# Patient Record
Sex: FEMALE | Race: WHITE | NOT HISPANIC OR LATINO | Employment: OTHER | URBAN - METROPOLITAN AREA
[De-identification: names, ages, dates, MRNs, and addresses within clinical notes are randomized per-mention and may not be internally consistent; named-entity substitution may affect disease eponyms.]

---

## 2017-08-09 ENCOUNTER — OFFICE VISIT (OUTPATIENT)
Dept: PEDIATRICS | Facility: CLINIC | Age: 47
End: 2017-08-09
Payer: COMMERCIAL

## 2017-08-09 ENCOUNTER — TELEPHONE (OUTPATIENT)
Dept: PEDIATRICS | Facility: CLINIC | Age: 47
End: 2017-08-09

## 2017-08-09 ENCOUNTER — RADIANT APPOINTMENT (OUTPATIENT)
Dept: GENERAL RADIOLOGY | Facility: CLINIC | Age: 47
End: 2017-08-09
Attending: INTERNAL MEDICINE
Payer: COMMERCIAL

## 2017-08-09 VITALS
HEART RATE: 78 BPM | HEIGHT: 69 IN | BODY MASS INDEX: 22.51 KG/M2 | SYSTOLIC BLOOD PRESSURE: 112 MMHG | TEMPERATURE: 96.7 F | OXYGEN SATURATION: 99 % | WEIGHT: 152 LBS | DIASTOLIC BLOOD PRESSURE: 70 MMHG

## 2017-08-09 DIAGNOSIS — S69.92XA FINGER INJURY, LEFT, INITIAL ENCOUNTER: Primary | ICD-10-CM

## 2017-08-09 DIAGNOSIS — M25.542 ARTHRALGIA OF LEFT HAND: ICD-10-CM

## 2017-08-09 DIAGNOSIS — S69.92XA FINGER INJURY, LEFT, INITIAL ENCOUNTER: ICD-10-CM

## 2017-08-09 LAB
BASOPHILS # BLD AUTO: 0 10E9/L (ref 0–0.2)
BASOPHILS NFR BLD AUTO: 0.6 %
CRP SERPL-MCNC: <2.9 MG/L (ref 0–8)
DIFFERENTIAL METHOD BLD: NORMAL
EOSINOPHIL # BLD AUTO: 0.2 10E9/L (ref 0–0.7)
EOSINOPHIL NFR BLD AUTO: 2.5 %
ERYTHROCYTE [DISTWIDTH] IN BLOOD BY AUTOMATED COUNT: 13.8 % (ref 10–15)
ERYTHROCYTE [SEDIMENTATION RATE] IN BLOOD BY WESTERGREN METHOD: 6 MM/H (ref 0–20)
HCT VFR BLD AUTO: 40.3 % (ref 35–47)
HGB BLD-MCNC: 13.8 G/DL (ref 11.7–15.7)
LYMPHOCYTES # BLD AUTO: 2.1 10E9/L (ref 0.8–5.3)
LYMPHOCYTES NFR BLD AUTO: 28.5 %
MCH RBC QN AUTO: 30 PG (ref 26.5–33)
MCHC RBC AUTO-ENTMCNC: 34.2 G/DL (ref 31.5–36.5)
MCV RBC AUTO: 88 FL (ref 78–100)
MONOCYTES # BLD AUTO: 0.6 10E9/L (ref 0–1.3)
MONOCYTES NFR BLD AUTO: 8.7 %
NEUTROPHILS # BLD AUTO: 4.3 10E9/L (ref 1.6–8.3)
NEUTROPHILS NFR BLD AUTO: 59.7 %
PLATELET # BLD AUTO: 234 10E9/L (ref 150–450)
RBC # BLD AUTO: 4.6 10E12/L (ref 3.8–5.2)
URATE SERPL-MCNC: 2.9 MG/DL (ref 2.6–6)
WBC # BLD AUTO: 7.2 10E9/L (ref 4–11)

## 2017-08-09 PROCEDURE — 73140 X-RAY EXAM OF FINGER(S): CPT | Mod: LT | Performed by: RADIOLOGY

## 2017-08-09 PROCEDURE — 84550 ASSAY OF BLOOD/URIC ACID: CPT | Performed by: INTERNAL MEDICINE

## 2017-08-09 PROCEDURE — 85025 COMPLETE CBC W/AUTO DIFF WBC: CPT | Performed by: INTERNAL MEDICINE

## 2017-08-09 PROCEDURE — 85652 RBC SED RATE AUTOMATED: CPT | Performed by: INTERNAL MEDICINE

## 2017-08-09 PROCEDURE — 99203 OFFICE O/P NEW LOW 30 MIN: CPT | Performed by: INTERNAL MEDICINE

## 2017-08-09 PROCEDURE — 36415 COLL VENOUS BLD VENIPUNCTURE: CPT | Performed by: INTERNAL MEDICINE

## 2017-08-09 PROCEDURE — 86140 C-REACTIVE PROTEIN: CPT | Performed by: INTERNAL MEDICINE

## 2017-08-09 NOTE — NURSING NOTE
"Chief Complaint   Patient presents with     Finger     Left index finger injury, Cut on knife 1 week ago, and now joint swelling       Initial /70 (Cuff Size: Adult Regular)  Pulse 78  Temp 96.7  F (35.9  C) (Temporal)  Ht 5' 9\" (1.753 m)  Wt 152 lb (68.9 kg)  SpO2 99%  BMI 22.45 kg/m2 Estimated body mass index is 22.45 kg/(m^2) as calculated from the following:    Height as of this encounter: 5' 9\" (1.753 m).    Weight as of this encounter: 152 lb (68.9 kg).  Medication Reconciliation: complete    "

## 2017-08-09 NOTE — TELEPHONE ENCOUNTER
[facility] Call Center    Phone Message    Name of Caller: Deirdre    Phone Number: Cell number on file:    Telephone Information:   Mobile [phone]       Best time to return call: any    May a detailed message be left on voicemail: yes    Relation to patient: Self    Reason for Call: Other: Patient is calling stating that she spoke with Raymundo Ortega who instructed the patient to start a chart and that Dr. Valencia clinic could work the patient in at 9:40 today for a New patient with Joint pain.  is unable to patient due to Dr. Fonseca having a patient scheduled at that time. Patient would like a call back once appointment is scheduled. Please advise,     Action Taken: Message routed to:  Primary Care p [phone]
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
## 2017-08-09 NOTE — MR AVS SNAPSHOT
After Visit Summary   2017    Deirdre Jesus    MRN: [id number]           Patient Information     Date Of Birth          1970        Visit Information        Provider Department      2017 10:00 AM Oneal Fonseca MD [facility]        Today's Diagnoses     Finger injury, left, initial encounter    -  1    Arthralgia of left hand          Care Instructions    Complete antibiotic.  Call if you blister development.                   Follow-ups after your visit        Who to contact     If you have questions or need follow up information about today's clinic visit or your schedule please contact [facility] directly at [phone].  Normal or non-critical lab and imaging results will be communicated to you by [employer], letter or phone within 4 business days after the clinic has received the results. If you do not hear from us within 7 days, please contact the clinic through [employer] or phone. If you have a critical or abnormal lab result, we will notify you by phone as soon as possible.  Submit refill requests through [employer] or call your pharmacy and they will forward the refill request to us. Please allow 3 business days for your refill to be completed.          Additional Information About Your Visit        MyChart Information     [employer] is an electronic gateway that provides easy, online access to your medical records. With [employer], you can request a clinic appointment, read your test results, renew a prescription or communicate with your care team.     To sign up for My [employer] visit the website at [email or web address]   You will be asked to enter the access code listed below, as well as some personal information. Please follow the directions to create your username and password.     Your access code is: [email or web address]  Expires: 2017 10:30 AM     Your access code will  in 90 days. If you need help or a new code, please contact your [facility]  [facility] [facility] or call [phone] for assistance.        Care EveryWhere ID     This is your Care EveryWhere ID. This could be used by other organizations to access your [facility] medical records  [id number]        Your Vitals Were     Pulse Temperature Height Pulse Oximetry BMI (Body Mass Index)       78 96.7  F (35.9  C) (Temporal) 5' 9\" (1.753 m) 99% 22.45 kg/m2        Blood Pressure from Last 3 Encounters:   08/09/17 112/70    Weight from Last 3 Encounters:   08/09/17 152 lb (68.9 kg)               Primary Care Provider    [facility]       No address on file        Equal Access to Services     FLEX QUIROS : Hadii carlin Huang, waaxda luqadaha, qaybta kaalmada adenorrisyada, alexis travis. So [facility] [phone].    ATENCIÓN: Si habla español, tiene a nathan disposición servicios gratuitos de asistencia lingüística. Llame al [phone].    We comply with applicable federal civil rights laws and [place] laws. We do not discriminate on the basis of race, color, national origin, age, disability sex, sexual orientation or gender identity.            Thank you!     Thank you for choosing [facility]  for your care. Our goal is always to provide you with excellent care. Hearing back from our patients is one way we can continue to improve our services. Please take a few minutes to complete the written survey that you may receive in the mail after your visit with us. Thank you!             Your Updated Medication List - Protect others around you: Learn how to safely use, store and throw away your medicines at www.disposemymeds.org.          This list is accurate as of: 8/9/17 10:30 AM.  Always use your most recent med list.                   Brand Name Dispense Instructions for use Diagnosis    amoxicillin-clavulanate 875-125 MG per tablet    AUGMENTIN     Take 1 tablet by mouth 2 times daily        IBUPROFEN PO      Take 800 mg " by mouth every 6 hours as needed for moderate pain

## 2017-08-09 NOTE — PROGRESS NOTES
Dear Deirdre,   Here are your recent results that we reviewed at your recent clinic visit.     Please call or Mychart if you have further questions.     Oneal Fonseca MD

## 2018-03-01 ENCOUNTER — TRANSFERRED RECORDS (OUTPATIENT)
Dept: HEALTH INFORMATION MANAGEMENT | Facility: CLINIC | Age: 48
End: 2018-03-01

## 2018-08-22 ENCOUNTER — OFFICE VISIT (OUTPATIENT)
Dept: PEDIATRICS | Facility: CLINIC | Age: 48
End: 2018-08-22
Payer: COMMERCIAL

## 2018-08-22 VITALS
WEIGHT: 159 LBS | SYSTOLIC BLOOD PRESSURE: 100 MMHG | HEART RATE: 71 BPM | TEMPERATURE: 97.1 F | OXYGEN SATURATION: 99 % | BODY MASS INDEX: 23.48 KG/M2 | DIASTOLIC BLOOD PRESSURE: 62 MMHG

## 2018-08-22 DIAGNOSIS — T30.0 BURN: Primary | ICD-10-CM

## 2018-08-22 PROCEDURE — 99213 OFFICE O/P EST LOW 20 MIN: CPT | Performed by: INTERNAL MEDICINE

## 2018-08-22 RX ORDER — SILVER SULFADIAZINE 10 MG/G
CREAM TOPICAL 2 TIMES DAILY
Qty: 30 G | Refills: 0 | Status: SHIPPED | OUTPATIENT
Start: 2018-08-22 | End: 2018-09-13

## 2018-08-22 NOTE — PATIENT INSTRUCTIONS
Medication(s) prescribed today:    Orders Placed This Encounter   Medications     silver sulfADIAZINE (SILVADENE) 1 % cream     Sig: Apply topically 2 times daily     Dispense:  30 g     Refill:  0
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
## 2018-09-13 ENCOUNTER — OFFICE VISIT (OUTPATIENT)
Dept: PEDIATRICS | Facility: CLINIC | Age: 48
End: 2018-09-13
Payer: COMMERCIAL

## 2018-09-13 VITALS
BODY MASS INDEX: 22.81 KG/M2 | SYSTOLIC BLOOD PRESSURE: 106 MMHG | DIASTOLIC BLOOD PRESSURE: 72 MMHG | WEIGHT: 154 LBS | TEMPERATURE: 97.3 F | OXYGEN SATURATION: 97 % | HEIGHT: 69 IN | HEART RATE: 73 BPM

## 2018-09-13 DIAGNOSIS — Z83.49 FAMILY HISTORY OF HEMOCHROMATOSIS: ICD-10-CM

## 2018-09-13 DIAGNOSIS — Z13.21 ENCOUNTER FOR VITAMIN DEFICIENCY SCREENING: ICD-10-CM

## 2018-09-13 DIAGNOSIS — Z13.220 LIPID SCREENING: ICD-10-CM

## 2018-09-13 DIAGNOSIS — L55.9 SUNBURN: ICD-10-CM

## 2018-09-13 DIAGNOSIS — Z12.11 SPECIAL SCREENING FOR MALIGNANT NEOPLASMS, COLON: ICD-10-CM

## 2018-09-13 DIAGNOSIS — Z00.00 ROUTINE GENERAL MEDICAL EXAMINATION AT A HEALTH CARE FACILITY: Primary | ICD-10-CM

## 2018-09-13 LAB
CHOLEST SERPL-MCNC: 209 MG/DL
ERYTHROCYTE [DISTWIDTH] IN BLOOD BY AUTOMATED COUNT: 13.2 % (ref 10–15)
FERRITIN SERPL-MCNC: 67 NG/ML (ref 8–252)
HCT VFR BLD AUTO: 40.9 % (ref 35–47)
HDLC SERPL-MCNC: 91 MG/DL
HGB BLD-MCNC: 13.5 G/DL (ref 11.7–15.7)
LDLC SERPL CALC-MCNC: 109 MG/DL
MCH RBC QN AUTO: 29.7 PG (ref 26.5–33)
MCHC RBC AUTO-ENTMCNC: 33 G/DL (ref 31.5–36.5)
MCV RBC AUTO: 90 FL (ref 78–100)
NONHDLC SERPL-MCNC: 118 MG/DL
PLATELET # BLD AUTO: 269 10E9/L (ref 150–450)
RBC # BLD AUTO: 4.55 10E12/L (ref 3.8–5.2)
TRIGL SERPL-MCNC: 43 MG/DL
WBC # BLD AUTO: 6 10E9/L (ref 4–11)

## 2018-09-13 PROCEDURE — 36415 COLL VENOUS BLD VENIPUNCTURE: CPT | Performed by: INTERNAL MEDICINE

## 2018-09-13 PROCEDURE — 82306 VITAMIN D 25 HYDROXY: CPT | Performed by: INTERNAL MEDICINE

## 2018-09-13 PROCEDURE — 82728 ASSAY OF FERRITIN: CPT | Performed by: INTERNAL MEDICINE

## 2018-09-13 PROCEDURE — 80061 LIPID PANEL: CPT | Performed by: INTERNAL MEDICINE

## 2018-09-13 PROCEDURE — 85027 COMPLETE CBC AUTOMATED: CPT | Performed by: INTERNAL MEDICINE

## 2018-09-13 PROCEDURE — 99386 PREV VISIT NEW AGE 40-64: CPT | Performed by: INTERNAL MEDICINE

## 2018-09-13 RX ORDER — MULTIPLE VITAMINS W/ MINERALS TAB 9MG-400MCG
1 TAB ORAL DAILY
COMMUNITY

## 2018-09-13 NOTE — PATIENT INSTRUCTIONS
-- Get labs done today.   -- Colonoscopy      Check with your insurance regarding coverage for Shingrix (RZV) - the new shingles vaccine.             Preventive Health Recommendations  Female Ages 50 - 64    Yearly exam: See your health care provider every year in order to  o Review health changes.   o Discuss preventive care.    o Review your medicines if your doctor has prescribed any.      Get a Pap test every three years (unless you have an abnormal result and your provider advises testing more often).    If you get Pap tests with HPV test, you only need to test every 5 years, unless you have an abnormal result.     You do not need a Pap test if your uterus was removed (hysterectomy) and you have not had cancer.    You should be tested each year for STDs (sexually transmitted diseases) if you're at risk.     Have a mammogram every 1 to 2 years.    Have a colonoscopy at age 50, or have a yearly FIT test (stool test). These exams screen for colon cancer.      Have a cholesterol test every 5 years, or more often if advised.    Have a diabetes test (fasting glucose) every three years. If you are at risk for diabetes, you should have this test more often.     If you are at risk for osteoporosis (brittle bone disease), think about having a bone density scan (DEXA).    Shots: Get a flu shot each year. Get a tetanus shot every 10 years.    Nutrition:     Eat at least 5 servings of fruits and vegetables each day.    Eat whole-grain bread, whole-wheat pasta and brown rice instead of white grains and rice.    Get adequate Calcium and Vitamin D.     Lifestyle    Exercise at least 150 minutes a week (30 minutes a day, 5 days a week). This will help you control your weight and prevent disease.    Limit alcohol to one drink per day.    No smoking.     Wear sunscreen to prevent skin cancer.     See your dentist every six months for an exam and cleaning.    See your eye doctor every 1 to 2 years.
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
## 2018-09-13 NOTE — MR AVS SNAPSHOT
After Visit Summary   9/13/2018    Deirdre Jesus    MRN: [id number]           Patient Information     Date Of Birth          1970        Visit Information        Provider Department      9/13/2018 9:10 AM Oneal Fonseca MD PhD [facility]        Today's Diagnoses     Routine general medical examination at a health care facility    -  1    Special screening for malignant neoplasms, colon        Sunburn        Lipid screening        Encounter for vitamin deficiency screening        Family history of hemochromatosis          Care Instructions    -- Get labs done today.   -- Colonoscopy      Check with your insurance regarding coverage for Shingrix (RZV) - the new shingles vaccine.             Preventive Health Recommendations  Female Ages 50 - 64    Yearly exam: See your health care provider every year in order to  o Review health changes.   o Discuss preventive care.    o Review your medicines if your doctor has prescribed any.      Get a Pap test every three years (unless you have an abnormal result and your provider advises testing more often).    If you get Pap tests with HPV test, you only need to test every 5 years, unless you have an abnormal result.     You do not need a Pap test if your uterus was removed (hysterectomy) and you have not had cancer.    You should be tested each year for STDs (sexually transmitted diseases) if you're at risk.     Have a mammogram every 1 to 2 years.    Have a colonoscopy at age 50, or have a yearly FIT test (stool test). These exams screen for colon cancer.      Have a cholesterol test every 5 years, or more often if advised.    Have a diabetes test (fasting glucose) every three years. If you are at risk for diabetes, you should have this test more often.     If you are at risk for osteoporosis (brittle bone disease), think about having a bone density scan (DEXA).    Shots: Get a flu shot each year. Get a tetanus shot every 10  years.    Nutrition:     Eat at least 5 servings of fruits and vegetables each day.    Eat whole-grain bread, whole-wheat pasta and brown rice instead of white grains and rice.    Get adequate Calcium and Vitamin D.     Lifestyle    Exercise at least 150 minutes a week (30 minutes a day, 5 days a week). This will help you control your weight and prevent disease.    Limit alcohol to one drink per day.    No smoking.     Wear sunscreen to prevent skin cancer.     See your dentist every six months for an exam and cleaning.    See your eye doctor every 1 to 2 years.            Follow-ups after your visit        Additional Services     DERMATOLOGY REFERRAL       Your provider has referred you to: [facility]: [facility] [phone]   [email or web address]    Please be aware that coverage of these services is subject to the terms and limitations of your health insurance plan.  Call member services at your health plan with any benefit or coverage questions.      Please bring the following with you to your appointment:    (1) Any X-Rays, CTs or MRIs which have been performed.  Contact the facility where they were done to arrange for  prior to your scheduled appointment.    (2) List of current medications  (3) This referral request   (4) Any documents/labs given to you for this referral            GASTROENTEROLOGY ADULT REF PROCEDURE ONLY [facility] [phone]       Last Lab Result: No results found for: CR  Body mass index is 23.08 kg/(m^2).     Needed:  No  Language:  English    Patient will be contacted to schedule procedure.     Please be aware that coverage of these services is subject to the terms and limitations of your health insurance plan.  Call member services at your health plan with any benefit or coverage questions.  Any procedures must be performed at a [place] facility OR coordinated by your clinic's  referral office.    Please bring the following with you to your appointment:    (1) Any X-Rays, CTs or MRIs which have been performed.  Contact the facility where they were done to arrange for  prior to your scheduled appointment.    (2) List of current medications   (3) This referral request   (4) Any documents/labs given to you for this referral                  Who to contact     If you have questions or need follow up information about today's clinic visit or your schedule please contact [facility] directly at [phone].  Normal or non-critical lab and imaging results will be communicated to you by Meshfirehart, letter or phone within 4 business days after the clinic has received the results. If you do not hear from us within 7 days, please contact the clinic through Meshfirehart or phone. If you have a critical or abnormal lab result, we will notify you by phone as soon as possible.  Submit refill requests through CommitChange or call your pharmacy and they will forward the refill request to us. Please allow 3 business days for your refill to be completed.          Additional Information About Your Visit        CommitChange Information     CommitChange is an electronic gateway that provides easy, online access to your medical records. With CommitChange, you can request a clinic appointment, read your test results, renew a prescription or communicate with your care team.     To sign up for CommitChange visit the website at [email or web address]   You will be asked to enter the access code listed below, as well as some personal information. Please follow the directions to create your username and password.     Your access code is: [email or web address]  Expires: 2018  2:31 PM     Your access code will  in 90 days. If you need help or a new code, please contact your [facility] or call [phone] for assistance.        Care EveryWhere ID     This is your Care EveryWhere ID. This  "could be used by other organizations to access your [place] medical records  [id number]        Your Vitals Were     Pulse Temperature Height Pulse Oximetry BMI (Body Mass Index)       73 97.3  F (36.3  C) (Temporal) 5' 8.5\" (1.74 m) 97% 23.08 kg/m2        Blood Pressure from Last 3 Encounters:   09/13/18 106/72   08/22/18 100/62   08/09/17 112/70    Weight from Last 3 Encounters:   09/13/18 154 lb (69.9 kg)   08/22/18 159 lb (72.1 kg)   08/09/17 152 lb (68.9 kg)              We Performed the Following     CBC with platelets     DERMATOLOGY REFERRAL     Ferritin     GASTROENTEROLOGY ADULT REF PROCEDURE ONLY [facility] [phone]     Lipid panel reflex to direct LDL Fasting     Vitamin D Deficiency        Primary Care Provider Office Phone # Fax #    [facility] [phone]       [address]  [facility] [address]        Equal Access to Services     FLEX QUIROS : Hadii aad ku hadasho Soomaali, waaxda luqadaha, qaybta kaalmada adeegyada, waxay idiin haynealn guerita linares . So [facility] [phone].    ATENCIÓN: Si habla español, tiene a nathan disposición servicios gratuitos de asistencia lingüística. Llame al [phone].    We comply with applicable federal civil rights laws and [place] laws. We do not discriminate on the basis of race, color, national origin, age, disability, sex, sexual orientation, or gender identity.            Thank you!     Thank you for choosing [facility]  for your care. Our goal is always to provide you with excellent care. Hearing back from our patients is one way we can continue to improve our services. Please take a few minutes to complete the written survey that you may receive in the mail after your visit with us. Thank you!             Your Updated Medication List - Protect others around you: Learn how to safely use, store and throw away your medicines at www.disposemymeds.org.          This list is accurate as " of 9/13/18 10:10 AM.  Always use your most recent med list.                   Brand Name Dispense Instructions for use Diagnosis    MAGNESIUM CITRATE PO           Multi-vitamin Tabs tablet      Take 1 tablet by mouth daily        vitamin B complex with vitamin C Tabs tablet      Take 1 tablet by mouth daily        VITAMIN D PO

## 2018-09-14 LAB — DEPRECATED CALCIDIOL+CALCIFEROL SERPL-MC: 48 UG/L (ref 20–75)

## 2018-09-14 NOTE — PROGRESS NOTES
Dear Deirdre,   Here are your recent results which are within the expected range.  Please continue with your current plan of care.     Please call or Mychart to our office if you have further questions.     Oneal Fonseca MD-PhD

## 2018-09-14 NOTE — PROGRESS NOTES
Dear Deirdre,   Here are your recent results.   --Lipid panel is borderline elevated. Prescription medication is not needed at this time. Healthy eating with low fat low cholesterol diet, exercise 30 minutes 3-5 times a week will help improve cholesterol. We'll monitor this annually with your physical.     Please call or Mychart to our office if you have further questions.     Oneal Fonseca MD-PhD

## 2018-10-19 ENCOUNTER — HOSPITAL ENCOUNTER (OUTPATIENT)
Facility: AMBULATORY SURGERY CENTER | Age: 48
Discharge: HOME | End: 2018-10-19
Attending: INTERNAL MEDICINE | Admitting: INTERNAL MEDICINE
Payer: COMMERCIAL

## 2018-10-19 ENCOUNTER — SURGERY (OUTPATIENT)
Age: 48
End: 2018-10-19

## 2018-10-19 VITALS
TEMPERATURE: 97.9 F | SYSTOLIC BLOOD PRESSURE: 97 MMHG | RESPIRATION RATE: 16 BRPM | DIASTOLIC BLOOD PRESSURE: 68 MMHG | OXYGEN SATURATION: 97 %

## 2018-10-19 LAB — COLONOSCOPY: NORMAL

## 2018-10-19 PROCEDURE — G8907 PT DOC NO EVENTS ON DISCHARG: HCPCS

## 2018-10-19 PROCEDURE — G8918 PT W/O PREOP ORDER IV AB PRO: HCPCS

## 2018-10-19 PROCEDURE — 45378 DIAGNOSTIC COLONOSCOPY: CPT

## 2018-10-19 RX ORDER — LIDOCAINE 40 MG/G
CREAM TOPICAL
Status: DISCONTINUED | OUTPATIENT
Start: 2018-10-19 | End: 2018-10-20 | Stop reason: HOSPADM

## 2018-10-19 RX ORDER — ONDANSETRON 2 MG/ML
4 INJECTION INTRAMUSCULAR; INTRAVENOUS
Status: DISCONTINUED | OUTPATIENT
Start: 2018-10-19 | End: 2018-10-20 | Stop reason: HOSPADM

## 2018-10-19 RX ORDER — FENTANYL CITRATE 50 UG/ML
INJECTION, SOLUTION INTRAMUSCULAR; INTRAVENOUS PRN
Status: DISCONTINUED | OUTPATIENT
Start: 2018-10-19 | End: 2018-10-19 | Stop reason: HOSPADM

## 2018-10-19 RX ADMIN — FENTANYL CITRATE 50 MCG: 50 INJECTION, SOLUTION INTRAMUSCULAR; INTRAVENOUS at 07:51

## 2018-10-19 RX ADMIN — FENTANYL CITRATE 50 MCG: 50 INJECTION, SOLUTION INTRAMUSCULAR; INTRAVENOUS at 07:50

## 2018-11-01 ENCOUNTER — OFFICE VISIT (OUTPATIENT)
Dept: DERMATOLOGY | Facility: CLINIC | Age: 48
End: 2018-11-01
Attending: INTERNAL MEDICINE
Payer: COMMERCIAL

## 2018-11-01 DIAGNOSIS — L57.8 SUN-DAMAGED SKIN: Primary | ICD-10-CM

## 2018-11-01 DIAGNOSIS — D22.9 MULTIPLE BENIGN NEVI: ICD-10-CM

## 2018-11-01 DIAGNOSIS — D23.9 DERMATOFIBROMA: ICD-10-CM

## 2018-11-01 DIAGNOSIS — I78.1 SPIDER ANGIOMA: ICD-10-CM

## 2018-11-01 DIAGNOSIS — L81.4 SOLAR LENTIGO: ICD-10-CM

## 2018-11-01 DIAGNOSIS — L73.8 SENILE SEBACEOUS GLAND HYPERPLASIA: ICD-10-CM

## 2018-11-01 DIAGNOSIS — L91.8 SKIN TAG: ICD-10-CM

## 2018-11-01 DIAGNOSIS — D18.01 CHERRY ANGIOMA: ICD-10-CM

## 2018-11-01 PROCEDURE — 99203 OFFICE O/P NEW LOW 30 MIN: CPT | Mod: 25 | Performed by: DERMATOLOGY

## 2018-11-01 PROCEDURE — 11200 RMVL SKIN TAGS UP TO&INC 15: CPT | Performed by: DERMATOLOGY

## 2018-11-01 ASSESSMENT — PAIN SCALES - GENERAL: PAINLEVEL: NO PAIN (0)
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
## 2018-11-01 NOTE — NURSING NOTE
Deirdre Jesus's goals for this visit include:   Chief Complaint   Patient presents with     Skin Check     One area of concern on left side under arm. No personal or family hx of SC.      She requests these members of her care team be copied on today's visit information:     PCP: Bharath, [facility]    Referring Provider:  Oneal Fonseca MD PhD  [address] [place] [address]    There were no vitals taken for this visit.    Do you need any medication refills at today's visit? No  Shasta Rosario LPN
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
## 2018-11-01 NOTE — MR AVS SNAPSHOT
After Visit Summary   11/1/2018    Deirdre Jesus    MRN: [id number]           Patient Information     Date Of Birth          1970        Visit Information        Provider Department      11/1/2018 9:15 AM Betty La MD [facility]        Today's Diagnoses     Sun-damaged skin    -  1    Solar lentigo        Skin tag        Senile sebaceous gland hyperplasia        Harley angioma        Spider angioma        Multiple benign nevi        Dermatofibroma          Care Instructions    Cryotherapy    What is it?    Use of a very cold liquid, such as liquid nitrogen, to freeze and destroy abnormal skin cells that need to be removed    What should I expect?    Tenderness and redness    A small blister that might grow and fill with dark purple blood. There may be crusting.    More than one treatment may be needed if the lesions do not go away.    How do I care for the treated area?    Gently wash the area with your hands when bathing.    Use a thin layer of Vaseline to help with healing. You may use a Band-Aid.     The area should heal within 7-10 days and may leave behind a pink or lighter color.     Do not use an antibiotic or Neosporin ointment.     You may take acetaminophen (Tylenol) for pain.     Call your Doctor if you have:    Severe pain    Signs of infection (warmth, redness, cloudy yellow drainage, and or a bad smell)    Questions or concerns    Who should I call with questions?       [facility]: [phone]       [facility]: [phone]       For urgent needs outside of business hours call the [facility] at [phone]        and ask for the dermatology resident on call    Try SkinCeutricals tinted daily moisturizer with sunscreen, found in our pharmacy.            Follow-ups after your visit        Who to contact     If you have questions or need follow up information about today's clinic visit  or your schedule please contact [facility] directly at [phone].  Normal or non-critical lab and imaging results will be communicated to you by [employer], letter or phone within 4 business days after the clinic has received the results. If you do not hear from us within 7 days, please contact the clinic through [employer] or phone. If you have a critical or abnormal lab result, we will notify you by phone as soon as possible.  Submit refill requests through [employer] or call your pharmacy and they will forward the refill request to us. Please allow 3 business days for your refill to be completed.          Additional Information About Your Visit        [employer] Information     [employer] gives you secure access to your electronic health record. If you see a primary care provider, you can also send messages to your care team and make appointments. If you have questions, please call your primary care clinic.  If you do not have a primary care provider, please call [phone] and they will assist you.      [employer] is an electronic gateway that provides easy, online access to your medical records. With [employer], you can request a clinic appointment, read your test results, renew a prescription or communicate with your care team.     To access your existing account, please contact your [facility] or call [phone] for assistance.        Care EveryWhere ID     This is your Care EveryWhere ID. This could be used by other organizations to access your [facility] medical records  [id number]         Blood Pressure from Last 3 Encounters:   10/19/18 97/68   09/13/18 106/72   08/22/18 100/62    Weight from Last 3 Encounters:   09/13/18 69.9 kg (154 lb)   08/22/18 72.1 kg (159 lb)   08/09/17 68.9 kg (152 lb)              We Performed the Following     DESTRUCT BENIGN LESION, UP TO 14        Primary Care Provider Office Phone # Fax #    [facility] [phone]  [phone]       [address]  [facility] [address]        Equal Access to Services     FLEX QUIROS : Hadii carlin kulkarni haddelmio Sosagarali, waisaiahda luqadaha, qaybta kaalmada tara, alexis aníbalin hayaalori hillsnorris dawn laJessejose travis. So [facility] [phone].    ATENCIÓN: Si habla español, tiene a nathan disposición servicios gratuitos de asistencia lingüística. Llame al [phone].    We comply with applicable federal civil rights laws and [place] laws. We do not discriminate on the basis of race, color, national origin, age, disability, sex, sexual orientation, or gender identity.            Thank you!     Thank you for choosing [facility]  for your care. Our goal is always to provide you with excellent care. Hearing back from our patients is one way we can continue to improve our services. Please take a few minutes to complete the written survey that you may receive in the mail after your visit with us. Thank you!             Your Updated Medication List - Protect others around you: Learn how to safely use, store and throw away your medicines at www.disposemymeds.org.          This list is accurate as of 11/1/18 12:23 PM.  Always use your most recent med list.                   Brand Name Dispense Instructions for use Diagnosis    MAGNESIUM CITRATE PO           Multi-vitamin Tabs tablet      Take 1 tablet by mouth daily        vitamin B complex with vitamin C Tabs tablet      Take 1 tablet by mouth daily        VITAMIN D PO

## 2018-11-01 NOTE — PATIENT INSTRUCTIONS
Cryotherapy    What is it?    Use of a very cold liquid, such as liquid nitrogen, to freeze and destroy abnormal skin cells that need to be removed    What should I expect?    Tenderness and redness    A small blister that might grow and fill with dark purple blood. There may be crusting.    More than one treatment may be needed if the lesions do not go away.    How do I care for the treated area?    Gently wash the area with your hands when bathing.    Use a thin layer of Vaseline to help with healing. You may use a Band-Aid.     The area should heal within 7-10 days and may leave behind a pink or lighter color.     Do not use an antibiotic or Neosporin ointment.     You may take acetaminophen (Tylenol) for pain.     Call your Doctor if you have:    Severe pain    Signs of infection (warmth, redness, cloudy yellow drainage, and or a bad smell)    Questions or concerns    Who should I call with questions?       [facility]: [phone]       [facility]: [phone]       For urgent needs outside of business hours call the [facility] at [phone]        and ask for the dermatology resident on call    Try SkinCeutricals tinted daily moisturizer with sunscreen, found in our pharmacy.

## 2019-05-31 ENCOUNTER — ANCILLARY PROCEDURE (OUTPATIENT)
Dept: MAMMOGRAPHY | Facility: CLINIC | Age: 49
End: 2019-05-31
Attending: INTERNAL MEDICINE
Payer: COMMERCIAL

## 2019-05-31 DIAGNOSIS — Z12.39 SCREENING BREAST EXAMINATION: ICD-10-CM

## 2019-05-31 PROCEDURE — 77067 SCR MAMMO BI INCL CAD: CPT | Performed by: RADIOLOGY

## 2019-10-10 ENCOUNTER — TRANSFERRED RECORDS (OUTPATIENT)
Dept: HEALTH INFORMATION MANAGEMENT | Facility: CLINIC | Age: 49
End: 2019-10-10

## 2019-10-10 LAB
HPV ABSTRACT: NORMAL
PAP-ABSTRACT: NORMAL

## 2019-10-24 ENCOUNTER — OFFICE VISIT (OUTPATIENT)
Dept: PEDIATRICS | Facility: CLINIC | Age: 49
End: 2019-10-24
Payer: COMMERCIAL

## 2019-10-24 VITALS
SYSTOLIC BLOOD PRESSURE: 126 MMHG | TEMPERATURE: 98 F | HEIGHT: 68 IN | WEIGHT: 153 LBS | BODY MASS INDEX: 23.19 KG/M2 | OXYGEN SATURATION: 97 % | HEART RATE: 62 BPM | DIASTOLIC BLOOD PRESSURE: 75 MMHG

## 2019-10-24 DIAGNOSIS — Z00.00 ROUTINE GENERAL MEDICAL EXAMINATION AT A HEALTH CARE FACILITY: Primary | ICD-10-CM

## 2019-10-24 DIAGNOSIS — E78.5 HYPERLIPIDEMIA LDL GOAL <160: ICD-10-CM

## 2019-10-24 DIAGNOSIS — N83.202 CYST OF LEFT OVARY: ICD-10-CM

## 2019-10-24 DIAGNOSIS — D25.1 INTRAMURAL LEIOMYOMA OF UTERUS: ICD-10-CM

## 2019-10-24 DIAGNOSIS — E83.110 FAMILIAL HEMOCHROMATOSIS (H): ICD-10-CM

## 2019-10-24 DIAGNOSIS — Z23 NEED FOR SHINGLES VACCINE: ICD-10-CM

## 2019-10-24 DIAGNOSIS — Z23 FLU VACCINE NEED: ICD-10-CM

## 2019-10-24 LAB
CHOLEST SERPL-MCNC: 221 MG/DL
FERRITIN SERPL-MCNC: 98 NG/ML (ref 8–252)
HDLC SERPL-MCNC: 91 MG/DL
LDLC SERPL CALC-MCNC: 117 MG/DL
NONHDLC SERPL-MCNC: 130 MG/DL
TRIGL SERPL-MCNC: 63 MG/DL

## 2019-10-24 PROCEDURE — 90471 IMMUNIZATION ADMIN: CPT | Performed by: INTERNAL MEDICINE

## 2019-10-24 PROCEDURE — 99396 PREV VISIT EST AGE 40-64: CPT | Mod: 25 | Performed by: INTERNAL MEDICINE

## 2019-10-24 PROCEDURE — 80061 LIPID PANEL: CPT | Performed by: INTERNAL MEDICINE

## 2019-10-24 PROCEDURE — 90682 RIV4 VACC RECOMBINANT DNA IM: CPT | Performed by: INTERNAL MEDICINE

## 2019-10-24 PROCEDURE — 36415 COLL VENOUS BLD VENIPUNCTURE: CPT | Performed by: INTERNAL MEDICINE

## 2019-10-24 PROCEDURE — 82728 ASSAY OF FERRITIN: CPT | Performed by: INTERNAL MEDICINE

## 2019-10-24 PROCEDURE — 90472 IMMUNIZATION ADMIN EACH ADD: CPT | Performed by: INTERNAL MEDICINE

## 2019-10-24 PROCEDURE — 90750 HZV VACC RECOMBINANT IM: CPT | Performed by: INTERNAL MEDICINE

## 2019-10-24 ASSESSMENT — MIFFLIN-ST. JEOR: SCORE: 1361.47

## 2019-10-24 NOTE — PROGRESS NOTES
SUBJECTIVE:   CC: Deirdre Jesus is an 49 year old woman who presents for preventive health visit.     HPI:  Seen by her ob recently for postmenopausal bleeding. Pelvic US showed small uterine fibroids and 2 simple ovarian cysts. She will return for recheck in 3 months.     Had pap done at ob's office. Mammogram and colonoscopy up to date.       Healthy Habits:    Do you get at least three servings of calcium containing foods daily (dairy, green leafy vegetables, etc.)? yes    Amount of exercise or daily activities, outside of work: 3 day(s) per week    Problems taking medications regularly No    Medication side effects: No    Have you had an eye exam in the past two years? yes    Do you see a dentist twice per year? yes    Do you have sleep apnea, excessive snoring or daytime drowsiness?no      Today's PHQ-2 Score:   PHQ-2 ( 1999 Pf[employer]) 10/24/2019 9/13/2018   Q1: Little interest or pleasure in doing things 0 0   Q2: Feeling down, depressed or hopeless 0 0   PHQ-2 Score 0 0       Abuse: Current or Past(Physical, Sexual or Emotional)- No  Do you feel safe in your environment? Yes    Social History     Tobacco Use     Smoking status: Never Smoker     Smokeless tobacco: Never Used   Substance Use Topics     Alcohol use: No     If you drink alcohol do you typically have >3 drinks per day or >7 drinks per week? No                     Reviewed orders with patient.  Reviewed health maintenance and updated orders accordingly - Yes  Labs reviewed in [facility]    Mammogram Screening: Patient over age 50, mutual decision to screen reflected in health maintenance.    Pertinent mammograms are reviewed under the imaging tab.  History of abnormal Pap smear: NO - age 30-65 PAP every 5 years with negative HPV co-testing recommended     Reviewed and updated as needed this visit by clinical staff  Tobacco  Allergies  Meds  Med Hx  Surg Hx  Fam Hx  Soc Hx        Reviewed and updated as needed this visit by Provider       "      ROS:  CONSTITUTIONAL: NEGATIVE for fever, chills, change in weight  INTEGUMENTARY/SKIN: NEGATIVE for worrisome rashes, moles or lesions  EYES: NEGATIVE for vision changes or irritation  ENT: NEGATIVE for ear, mouth and throat problems  RESP: NEGATIVE for significant cough or SOB  BREAST: NEGATIVE for masses, tenderness or discharge  CV: NEGATIVE for chest pain, palpitations or peripheral edema  GI: NEGATIVE for nausea, abdominal pain, heartburn, or change in bowel habits  : NEGATIVE for unusual urinary or vaginal symptoms. No vaginal bleeding.  MUSCULOSKELETAL: NEGATIVE for significant arthralgias or myalgia  NEURO: NEGATIVE for weakness, dizziness or paresthesias  PSYCHIATRIC: NEGATIVE for changes in mood or affect     OBJECTIVE:   /75   Pulse 62   Temp 98  F (36.7  C) (Temporal)   Ht 1.734 m (5' 8.25\")   Wt 69.4 kg (153 lb)   SpO2 97%   BMI 23.09 kg/m    EXAM:  GENERAL: healthy, alert and no distress  EYES: Eyes grossly normal to inspection, PERRL and conjunctivae and sclerae normal  HENT: ear canals and TM's normal, nose and mouth without ulcers or lesions  NECK: no adenopathy, no asymmetry, masses, or scars and thyroid normal to palpation  RESP: lungs clear to auscultation - no rales, rhonchi or wheezes  CV: regular rate and rhythm, normal S1 S2, no S3 or S4, no murmur, click or rub, no peripheral edema and peripheral pulses strong  ABDOMEN: soft, nontender, no hepatosplenomegaly, no masses and bowel sounds normal  MS: no gross musculoskeletal defects noted, no edema  SKIN: no suspicious lesions or rashes  NEURO: Normal strength and tone, mentation intact and speech normal  PSYCH: mentation appears normal, affect normal/bright    Diagnostic Test Results:  Labs pending    ASSESSMENT/PLAN:       ICD-10-CM    1. Routine general medical examination at a health care facility Z00.00    2. Flu vaccine need Z23 C RIV4 (FLUBLOK) VACCINE RECOMBINANT DNA PRSRV ANTIBIO FREE, IM [79712]   3. Intramural " "leiomyoma of uterus D25.1    4. Familial hemochromatosis (H) E83.110 Ferritin   5. Cyst of left ovary N83.202    6. Hyperlipidemia LDL goal <160 E78.5 Lipid panel reflex to direct LDL Fasting   7. Need for shingles vaccine Z23 ZOSTER VACCINE RECOMBINANT ADJUVANTED IM NJX     Updated recent health. Updated vaccines. Due for Td as well.   Pt plans to return in 2 months for Shingrix #2 and Td then.     COUNSELING:   Reviewed preventive health counseling, as reflected in patient instructions    Estimated body mass index is 23.09 kg/m  as calculated from the following:    Height as of this encounter: 1.734 m (5' 8.25\").    Weight as of this encounter: 69.4 kg (153 lb).         reports that she has never smoked. She has never used smokeless tobacco.      Counseling Resources:  ATP IV Guidelines  Pooled Cohorts Equation Calculator  Breast Cancer Risk Calculator  FRAX Risk Assessment  ICSI Preventive Guidelines  Dietary Guidelines for Americans, 2010  USDA's MyPlate  ASA Prophylaxis  Lung CA Screening    Oneal Fonseca MD PhD  [facility]  "

## 2019-10-24 NOTE — PATIENT INSTRUCTIONS
Make appointment(s) for:   -- get labs.   -- nurse visit in 2 month for Shingrix #2 and Td.           Preventive Health Recommendations  Female Ages 50 - 64    Yearly exam: See your health care provider every year in order to  o Review health changes.   o Discuss preventive care.    o Review your medicines if your doctor has prescribed any.      Get a Pap test every three years (unless you have an abnormal result and your provider advises testing more often).    If you get Pap tests with HPV test, you only need to test every 5 years, unless you have an abnormal result.     You do not need a Pap test if your uterus was removed (hysterectomy) and you have not had cancer.    You should be tested each year for STDs (sexually transmitted diseases) if you're at risk.     Have a mammogram every 1 to 2 years.    Have a colonoscopy at age 50, or have a yearly FIT test (stool test). These exams screen for colon cancer.      Have a cholesterol test every 5 years, or more often if advised.    Have a diabetes test (fasting glucose) every three years. If you are at risk for diabetes, you should have this test more often.     If you are at risk for osteoporosis (brittle bone disease), think about having a bone density scan (DEXA).    Shots: Get a flu shot each year. Get a tetanus shot every 10 years.    Nutrition:     Eat at least 5 servings of fruits and vegetables each day.    Eat whole-grain bread, whole-wheat pasta and brown rice instead of white grains and rice.    Get adequate Calcium and Vitamin D.     Lifestyle    Exercise at least 150 minutes a week (30 minutes a day, 5 days a week). This will help you control your weight and prevent disease.    Limit alcohol to one drink per day.    No smoking.     Wear sunscreen to prevent skin cancer.     See your dentist every six months for an exam and cleaning.    See your eye doctor every 1 to 2 years.

## 2019-10-24 NOTE — NURSING NOTE
Screening Questionnaire for Adult Immunization    Are you sick today?   No   Do you have allergies to medications, food, a vaccine component or latex?   No   Have you ever had a serious reaction after receiving a vaccination?   No   Do you have a long-term health problem with heart disease, lung disease, asthma, kidney disease, metabolic disease (e.g. diabetes), anemia, or other blood disorder?   No   Do you have cancer, leukemia, HIV/AIDS, or any other immune system problem?   No   In the past 3 months, have you taken medications that affect  your immune system, such as prednisone, other steroids, or anticancer drugs; drugs for the treatment of rheumatoid arthritis, Crohn s disease, or psoriasis; or have you had radiation treatments?   No   Have you had a seizure, or a brain or other nervous system problem?   No   During the past year, have you received a transfusion of blood or blood     products, or been given immune (gamma) globulin or antiviral drug?   No   For women: Are you pregnant or is there a chance you could become        pregnant during the next month?   No   Have you received any vaccinations in the past 4 weeks?   No     Immunization questionnaire answers were all negative.      MNVFC doesn't apply on this patient           Screening performed by Yas Tyson

## 2019-10-25 NOTE — RESULT ENCOUNTER NOTE
Dear Deirdre,   Your recent lab results showed the following:  -- ferritin is a little higher than last year but still in the low end of the normal. We'll recheck in one year.  -- cholesterol panel is a little higher than last year too. I know you already eat pretty healthy. May need to be a bit more cognizant of good and bad fats in the diet. We'll recheck next year as well.     Please call or Mychart to our office if you have further questions.     Oneal Fonseca MD-PhD

## 2020-01-08 ENCOUNTER — ALLIED HEALTH/NURSE VISIT (OUTPATIENT)
Dept: PEDIATRICS | Facility: CLINIC | Age: 50
End: 2020-01-08
Payer: COMMERCIAL

## 2020-01-08 DIAGNOSIS — Z23 NEED FOR SHINGLES VACCINE: Primary | ICD-10-CM

## 2020-01-08 DIAGNOSIS — Z23 NEED FOR TD VACCINE: ICD-10-CM

## 2020-01-08 PROCEDURE — 90714 TD VACC NO PRESV 7 YRS+ IM: CPT

## 2020-01-08 PROCEDURE — 90471 IMMUNIZATION ADMIN: CPT

## 2020-01-08 PROCEDURE — 90750 HZV VACC RECOMBINANT IM: CPT

## 2020-01-08 PROCEDURE — 99207 ZZC NO CHARGE NURSE ONLY: CPT

## 2020-01-08 PROCEDURE — 90472 IMMUNIZATION ADMIN EACH ADD: CPT

## 2020-01-08 NOTE — PROGRESS NOTES
Deirdre Jesus comes into clinic today at the request of Dr. Fonseca, Ordering Provider, for immunization update.    Shingrix #2, Td    Prior to immunization administration, verified patients identity using patient s name and date of birth. Please see Immunization Activity for additional information.     Screening Questionnaire for Adult Immunization    Are you sick today?   No   Do you have allergies to medications, food, a vaccine component or latex?   No   Have you ever had a serious reaction after receiving a vaccination?   No   Do you have a long-term health problem with heart, lung, kidney, or metabolic disease (e.g., diabetes), asthma, a blood disorder, no spleen, complement component deficiency, a cochlear implant, or a spinal fluid leak?  Are you on long-term aspirin therapy?   No   Do you have cancer, leukemia, HIV/AIDS, or any other immune system problem?   No   Do you have a parent, brother, or sister with an immune system problem?   No   In the past 3 months, have you taken medications that affect  your immune system, such as prednisone, other steroids, or anticancer drugs; drugs for the treatment of rheumatoid arthritis, Crohn s disease, or psoriasis; or have you had radiation treatments?   No   Have you had a seizure, or a brain or other nervous system problem?   No   During the past year, have you received a transfusion of blood or blood    products, or been given immune (gamma) globulin or antiviral drug?   No   For women: Are you pregnant or is there a chance you could become       pregnant during the next month?   No   Have you received any vaccinations in the past 4 weeks?   No     Immunization questionnaire answers were all negative.        Per orders of Dr. Fonseca, injection of Td, Shingrix given by Shanta Lemus RN.  Patient instructed to remain in clinic for 15 minutes afterwards, and to report any adverse reaction to me immediately.       Screening performed by Shanta Lemus RN on 1/8/2020 at  10:07 AM.      This service provided today was under the supervising provider of the day Dr. Fonseca, who was available if needed.    Shanta Lemus RN

## 2020-01-30 ENCOUNTER — OFFICE VISIT (OUTPATIENT)
Dept: FAMILY MEDICINE | Facility: CLINIC | Age: 50
End: 2020-01-30
Payer: COMMERCIAL

## 2020-01-30 VITALS
TEMPERATURE: 98.5 F | WEIGHT: 152 LBS | HEIGHT: 69 IN | BODY MASS INDEX: 22.51 KG/M2 | OXYGEN SATURATION: 99 % | HEART RATE: 70 BPM | RESPIRATION RATE: 16 BRPM | DIASTOLIC BLOOD PRESSURE: 68 MMHG | SYSTOLIC BLOOD PRESSURE: 104 MMHG

## 2020-01-30 DIAGNOSIS — H65.02 ACUTE SEROUS OTITIS MEDIA OF LEFT EAR, RECURRENCE NOT SPECIFIED: Primary | ICD-10-CM

## 2020-01-30 PROCEDURE — 99213 OFFICE O/P EST LOW 20 MIN: CPT | Performed by: PHYSICIAN ASSISTANT

## 2020-01-30 ASSESSMENT — MIFFLIN-ST. JEOR: SCORE: 1355.91

## 2020-01-30 NOTE — PATIENT INSTRUCTIONS
Take augmentin twice a day for 10 days  Follow up with us if no improvement in symptoms over the next 7 days  Return urgently if any change in symptoms like cough, fever, sore throat or other change in symptoms.    To prevent excessive ear wax I recommend avoiding qtips in the ears.  Use olive oil, mineral oil or baby oil and put in a dropper bottle and place 4 drops in both ears every Sunday night.  Place cotton loosely in ear canal after drops installed.     Patient Education       At [facility], we strive to deliver an exceptional experience to you, every time we see you. If you receive a survey, please complete it as we do value your feedback.  If you have MyChart, you can expect to receive results automatically within 24 hours of their completion.  Your provider will send a note interpreting your results as well.   If you do not have MyChart, you should receive your results in about a week by mail.    Your care team:     Family Medicine   BHUPINDER Stein MD Emily Bunt, LIANG [facility]   S. MD Manisha Gan MD Angela Wermerskirchen, MD    Internal Medicine  Jan Casarez MD coming 2020     Clinic hours: Monday - Wednesday 7 am-7 pm   Thursdays and Fridays 7 am-5 pm.     [facility]: Monday - Friday 11 am-9 pm,   Saturday and Sunday 9 am-5 pm.    [facility]: Monday -Thursday 8 am-8 pm; Friday 8 am-6 pm; Saturday and Sunday 9 am-5 pm.     [facility]: Monday - Thursday 8 am - 7 pm; Friday 8 am - 6 pm    Clinic: [phone]   [facility]: [phone] m [facility]: [phone]

## 2020-03-02 ENCOUNTER — TRANSFERRED RECORDS (OUTPATIENT)
Dept: HEALTH INFORMATION MANAGEMENT | Facility: CLINIC | Age: 50
End: 2020-03-02

## 2020-03-14 ENCOUNTER — VIRTUAL VISIT (OUTPATIENT)
Dept: PEDIATRICS | Facility: CLINIC | Age: 50
End: 2020-03-14
Payer: COMMERCIAL

## 2020-03-14 DIAGNOSIS — N83.292 COMPLEX CYST OF LEFT OVARY: Primary | ICD-10-CM

## 2020-03-14 PROCEDURE — 99213 OFFICE O/P EST LOW 20 MIN: CPT | Mod: 95 | Performed by: INTERNAL MEDICINE

## 2020-03-14 NOTE — PROGRESS NOTES
"Deirdre Jesus is a 50 year old female who is being evaluated via a billable telephone visit.      The patient has been notified of following:     \"This telephone visit will be conducted via a call between you and your physician/provider. We have found that certain health care needs can be provided without the need for a physical exam.  This service lets us provide the care you need with a short phone conversation.  If a prescription is necessary we can send it directly to your pharmacy.  If lab work is needed we can place an order for that and you can then stop by our lab to have the test done at a later time.    If during the course of the call the physician/provider feels a telephone visit is not appropriate, you will not be charged for this service.\"       Deirdre Jesus complains of    Chief Complaint   Patient presents with     Results     lab work and ultrasound results         I have reviewed and updated the patient's Past Medical History, Social History, Family History and Medication List.    ALLERGIES  Patient has no known allergies.    Rebecca Olmedo (MA signature)    Last October, had US for spotting.  She was evaluated by OB/GYN, pelvic ultrasound showed 2 simple cyst in the left ovary.  It was recommended that she gets follow-up in 3 months.  She had a recent pelvic ultrasound done through her OB, the radiologist is reading 2 complex cysts rather than simple cysts.  Recommended surgical consultation.  Patient is wanting to have a second opinion regarding the cysts.  She has Ca1 25 done on 3/11, 26, which is normal.  She has no family history of ovarian cancer    She also when on herbal supplement for menopause for hot flashes and insomnia. They took away all her symptoms and spotting.  Black cohosh is 1 of the ingredients of the supplement.  It is nonhormonal based.    I reviewed her pelvic ultrasound reports and Ca1 25 lab result through care everywhere.    Result Impression   COMPARISON: "  10/11/2019    TECHNIQUE:  Transabdominal and transvaginal imaging was performed.    FINDINGS:    Uterus: Measures 6.3 x 4.4 x 4.5 cm. There is a 1.0 x 1.1 x 1.0 cm anterior intramural uterine fibroid which is slightly increased in size compared with the prior exam.    Endometrium: Measures up to 0.2 cm in thickness.      Right Ovary: Measures 2.7 x 1.5 x 1.7 cm and appears unremarkable    Right Ovary Blood Flow: Present.    Left Ovary: Measures 3.3 x 2.4 x 3.1 cm. There is a complex 2.5 x 2.0 x 1.3 cm cyst in the left ovary with internal debris and what appears to be several smaller daughter cyst. Previously this cyst measured 2.5 x 2.3 x 1.6 cm. There is an adjacent smaller complex appearing cyst in the left ovary which measures 1.5 x 0.5 cm, which appears grossly stable compared to prior exam although it was not measured previously. This cyst contains either internal septations or daughter cysts.    Left Ovary Blood Flow: Present.    Free Fluid: no significant free fluid.    IMPRESSION:   1. Stable appearance of of 2 complex left ovarian cysts measuring up to 2.5 cm. Previously seen simple appearing 3.4 cm cyst is not identified on this exam. Given the complex appearance of the cysts a surgical consultation is recommended, if it has not already been performed.         Assessment/Plan:    ICD-10-CM    1. Complex cyst of left ovary  N83.292 Oncology/Hematology Adult Referral      Patient has 2 complex ovarian cyst.  I do agree with recommendation to obtain a surgical consultation.  This does not commit her to surgery.  Patient wants to transfer care into our healthcare system.  I put in a referral for surgical oncology to evaluate this patient.    I have reviewed the note as documented above.  This accurately captures the substance of my conversation with the patient.      Phone call contact time  Call Started at 10:55 AM  Call Ended at 11:07 AM    Oneal Fonseca MD PhD

## 2020-03-17 NOTE — TELEPHONE ENCOUNTER
RECORDS STATUS - ALL OTHER DIAGNOSIS      RECORDS RECEIVED FROM: Epic/CE   DATE RECEIVED: 4/6/2020    NOTES STATUS DETAILS   OFFICE NOTE from referring provider Complete  referring Dr. Fonseca   OFFICE NOTE from medical oncologist N/A    DISCHARGE SUMMARY from hospital N/A    DISCHARGE REPORT from the ER     OPERATIVE REPORT N/A    MEDICATION LIST Complete [facility]   CLINICAL TRIAL TREATMENTS TO DATE     LABS     PATHOLOGY REPORTS N/A    ANYTHING RELATED TO DIAGNOSIS Complete Labs last updated in CE 3/11/2020    GENONOMIC TESTING     TYPE:     IMAGING (NEED IMAGES & REPORT)     CT SCANS     MRI     MAMMO     ULTRASOUND Complete-[facility] 3/9/2020, 10/11/2019 US Pelvic Complete    PET       Action    Action Taken 3/18/2020 10:34am     I faxed a request for IMG to [employer].     3/19/2020 10:56am   IMG in PACS.

## 2020-03-27 ENCOUNTER — TELEPHONE (OUTPATIENT)
Dept: ONCOLOGY | Facility: CLINIC | Age: 50
End: 2020-03-27

## 2020-03-27 NOTE — TELEPHONE ENCOUNTER
Per Marcio/Dr Simpson reviewed and is requesting pt be seen in regular Gyn clinic instead of gyn onc. IB sent to  gyn scheduling pool to contact patient & reschedule I left a voicemail requesting a return call to confirm she is aware of cancel & provided contact info for gyn clinic scheduling. IB sent to Marcio with status.

## 2020-03-30 NOTE — TELEPHONE ENCOUNTER
RN for [facility] is routing this to OB/GYN provider pool for advisement on when patient needs to be seen due to COVID-19 precautions.     Masha Camarillo RN on 3/30/2020 at 11:57 AM

## 2020-03-30 NOTE — TELEPHONE ENCOUNTER
I spoke to patient and scheduled her a telephone visit.  Zee Newberry, [facility]  March 30, 2020 12:28 PM

## 2020-04-02 NOTE — TELEPHONE ENCOUNTER
Per clinic patient rescheduled from Dr. Simpson to Dr. Uriarte in [place] 5/1/20. Patient aware of change and verbalized understanding.

## 2020-04-03 NOTE — TELEPHONE ENCOUNTER
RECORDS STATUS - ALL OTHER DIAGNOSIS      RECORDS RECEIVED FROM: [facility]    DATE RECEIVED: 5/1/2020    NOTES STATUS DETAILS   OFFICE NOTE from referring provider Complete  referring Dr. Fonseca   OFFICE NOTE from medical oncologist N/A [facility] - Pt met with an OB to address the left ovarian cyst    DISCHARGE SUMMARY from hospital N/A    DISCHARGE REPORT from the ER N/A    OPERATIVE REPORT     MEDICATION LIST Complete [facility]    CLINICAL TRIAL TREATMENTS TO DATE     LABS     PATHOLOGY REPORTS N/A    ANYTHING RELATED TO DIAGNOSIS Complete Labs last done at  on 3/11/2020    GENONOMIC TESTING     TYPE:     IMAGING (NEED IMAGES & REPORT)     CT SCANS     MRI     MAMMO     ULTRASOUND Complete - [facility] 3/9/2020   10/11/2019    PET       Action    Action Taken 4/3/2020 2:20pm     I faxed a request over to [facility] to have IMG pushed to  PACS.

## 2020-04-06 ENCOUNTER — PRE VISIT (OUTPATIENT)
Dept: ONCOLOGY | Facility: CLINIC | Age: 50
End: 2020-04-06

## 2020-04-30 NOTE — PROGRESS NOTES
Consult Notes on Referred Patient        Dr. Oneal Fonseca MD PhD  [address] [place] [address]       RE: Deirdre Jesus  : 1970  LILLIAN: May 1, 2020     Dear Dr. Oneal Fonseca:    I had the pleasure of doing a virtual consult with your patient Deirdre Jesus here at the Gynecologic Cancer Clinic at the [facility] on 2020.  As you know she is a very pleasant 50 year old woman with a recent diagnosis of pelvic mass.  She notes she presented in October with light spotting and mild lower abdominal pain.  She notes she had only one menses in the previous two years.  She had an US at that time that showed a mostly simple cyst.  She then underwent a repeat US in March which showed a continued cyst which was slightly smaller in size but perhaps slightly more complex.  Her spotting and abdominal pains have completely resolved.    10/11/19:  US Pelvis:  Uterus: Measures 7.3 x 4.7 x 5.8 cm. A number of small intramural leiomyomata are demonstrated, the largest of which measures up to 12 mm. The uterus is retroverted, but otherwise unremarkable. Endometrium: Measures up to 0.4 cm in thickness and appears homogeneous.   Right Ovary: Measures 1.8 x 1.0 x 0.9 cm and appears unremarkable. Right Ovary Blood Flow: Present. Left Ovary: Measures 5.3 x 2.3 x 3.7 cm. There are two cystic structures within the left ovary. Vascular flow is not demonstrated in either cyst. The largest cyst is simple in appearance and measures 3.4 x 3.2 x 1.7 cm. The second cyst contains a daughter cyst but is otherwise simple. This measures 2.3 x 2.5 x 1.6 cm. Left Ovary Blood Flow: Present. Free Fluid: no significant free fluid.    3/9/20:  US pelvis:  Uterus: Measures 6.3 x 4.4 x 4.5 cm. There is a 1.0 x 1.1 x 1.0 cm anterior intramural uterine fibroid which is slightly increased in size compared with the prior exam. Endometrium: Measures up to 0.2 cm in thickness.  Right Ovary: Measures 2.7 x 1.5 x 1.7 cm and appears  unremarkable. Right Ovary Blood Flow: Present. Left Ovary: Measures 3.3 x 2.4 x 3.1 cm. There is a complex 2.5 x 2.0 x 1.3 cm cyst in the left ovary with internal debris and what appears to be several smaller daughter cyst. Previously this cyst measured 2.5 x 2.3 x 1.6 cm. There is an adjacent smaller complex appearing cyst in the left ovary which measures 1.5 x 0.5 cm, which appears grossly stable compared to prior exam although it was not measured previously. This cyst contains either internal septations or daughter cysts. Left Ovary Blood Flow: Present. Free Fluid: no significant free fluid.    3/11/20:   = 26      Review of Systems:  Systemic           no weight changes; no fever; no chills; no night sweats; no appetite changes  Skin           no rashes, or lesions  Eye           no irritation; no changes in vision  Jordyn-Laryngeal           no dysphagia; no hoarseness   Pulmonary    no cough; no shortness of breath  Cardiovascular    no chest pain; no palpitations  Gastrointestinal    no diarrhea; no constipation; no abdominal pain; no changes in bowel  habits; no blood in stool  Genitourinary   no urinary frequency; no urinary urgency; no dysuria; no pain; no abnormal vaginal discharge; no abnormal vaginal bleeding  Breast    no breast discharge; no breast changes; no breast pain  Musculoskeletal    no myalgias; no arthralgias; + chronic back pain  Psychiatric           no depressed mood; no anxiety    Hematologic            no tender lymph nodes; no noticeable swellings or lumps   Endocrine    no hot flashes; no heat/cold intolerance         Neurological   no tremor; no numbness and tingling; no headaches; no difficulty sleeping    Obstetrics and Gynecology History:  ,  x2, SAb x2  Menopause at age 50, no HRT use      Past Medical History:  History reviewed. No pertinent past medical history.    Past Surgical History:  Past Surgical History:   Procedure Laterality Date     BUNIONECTOMY Bilateral       COLONOSCOPY WITH CO2 INSUFFLATION N/A 10/19/2018    Procedure: colonscopy;  Surgeon: Duane, William Charles, MD;  Location:  OR       Health Maintenance:  Last Pap Smear: 10/10/19              Result: Negative, HPV negative  She has not had a history of abnormal Pap smears.    Last Mammogram: 5/31/19              Result: normal      She has not had a history of abnormal mammograms.    Last Colonoscopy: 10/19/18              Result: negative, repeat 10 years      Current Medications:   has a current medication list which includes the following prescription(s): vitamin d, multivitamin w/minerals, UNABLE TO FIND, vitamin b complex with vitamin c, and magnesium citrate.     Allergies:   Patient has no known allergies.      Social History:  Social History     Socioeconomic History     Marital status:      Spouse name: Not on file     Number of children: Not on file     Years of education: Not on file     Highest education level: Not on file   Occupational History     Not on file   Social Needs     Financial resource strain: Not on file     Food insecurity     Worry: Not on file     Inability: Not on file     Transportation needs     Medical: Not on file     Non-medical: Not on file   Tobacco Use     Smoking status: Never Smoker     Smokeless tobacco: Never Used   Substance and Sexual Activity     Alcohol use: No     Drug use: No     Sexual activity: Yes     Partners: Male     Birth control/protection: Post-menopausal   Lifestyle     Physical activity     Days per week: Not on file     Minutes per session: Not on file     Stress: Not on file   Relationships     Social connections     Talks on phone: Not on file     Gets together: Not on file     Attends [employer]ism service: Not on file     Active member of club or organization: Not on file     Attends meetings of clubs or organizations: Not on file     Relationship status: Not on file     Intimate partner violence     Fear of current or ex partner: Not on file      Emotionally abused: Not on file     Physically abused: Not on file     Forced sexual activity: Not on file   Other Topics Concern     Not on file   Social History Narrative     Not on file       Lives with , feels safe at home.  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  Enjoys cooking, reading, gardening.  Does not have an advanced directive on file and would like her  to be her POA.  Would like full resuscitation if reversible cause is identified, however would not like to be kept on life sustaining measures long-term.      Family History:   The patient's family history is significant for.  Family History   Problem Relation Age of Onset     Coronary Artery Disease Father 53        MI Fatal      Diabetes No family hx of      Colon Cancer No family hx of      Breast Cancer No family hx of          Assessment:    Deirdre Jesus is a 50 year old woman with a new diagnosis of pelvic mass.     A total of 30 minutes was spent with the patient on the telephone, >50% of which were spent in counseling the patient and/or treatment planning.      Plan:     1.)    Pelvic mass-we reviewed her US results from both October and March showing perhaps a slight decrease in size but also slight increased complexity.  We reviewed the possible etiologies including benign, malignant and borderline.  We reviewed her  results which were normal which combined with the US findings make the possibility of malignancy low.  We discussed the rationale for not performing a biopsy.  We discussed the options of repeat US in 3 months from her previous US vs immediate surgical intervention.  Given the low likelihood of malignancy she would like to proceed with repeat imaging.  We discussed if stable at that point, would recommend one further US at one year from her original US in 10/20.  If that remains stable, would not recommend further imaging.     2.) Genetic risk factors were  assessed and the patient does not meet the qualifications for a referral.      3.) Labs and/or tests ordered include:  US pelvis.     4.) Health maintenance issues addressed today include pt is up to date.          Thank you for allowing us to participate in the care of your patient.         Sincerely,    Paola Chirinos MD  Gynecologic Oncology  [facility]       NADINE [employer]

## 2020-05-01 ENCOUNTER — PRE VISIT (OUTPATIENT)
Dept: ONCOLOGY | Facility: CLINIC | Age: 50
End: 2020-05-01

## 2020-05-01 ENCOUNTER — VIRTUAL VISIT (OUTPATIENT)
Dept: ONCOLOGY | Facility: CLINIC | Age: 50
End: 2020-05-01
Attending: INTERNAL MEDICINE
Payer: COMMERCIAL

## 2020-05-01 DIAGNOSIS — N83.292 COMPLEX CYST OF LEFT OVARY: Primary | ICD-10-CM

## 2020-05-01 PROCEDURE — 99203 OFFICE O/P NEW LOW 30 MIN: CPT | Mod: 95 | Performed by: OBSTETRICS & GYNECOLOGY

## 2020-05-01 NOTE — PROGRESS NOTES
"Deirdre Jesus is a 50 year old female who is being evaluated via a billable telephone visit.      The patient has been notified of following:     \"This telephone visit will be conducted via a call between you and your physician/provider. We have found that certain health care needs can be provided without the need for a physical exam.  This service lets us provide the care you need with a short phone conversation.  If a prescription is necessary we can send it directly to your pharmacy.  If lab work is needed we can place an order for that and you can then stop by our lab to have the test done at a later time.    Telephone visits are billed at different rates depending on your insurance coverage. During this emergency period, for some insurers they may be billed the same as an in-person visit.  Please reach out to your insurance provider with any questions.    If during the course of the call the physician/provider feels a telephone visit is not appropriate, you will not be charged for this service.\"    Patient has given verbal consent for Telephone visit?  Yes    How would you like to obtain your AVS? Keith Angel LPN on 5/1/2020 at 8:25 AM    "

## 2020-06-03 ENCOUNTER — ANCILLARY PROCEDURE (OUTPATIENT)
Dept: ULTRASOUND IMAGING | Facility: CLINIC | Age: 50
End: 2020-06-03
Attending: OBSTETRICS & GYNECOLOGY
Payer: COMMERCIAL

## 2020-06-03 DIAGNOSIS — N83.292 COMPLEX CYST OF LEFT OVARY: ICD-10-CM

## 2020-06-03 PROCEDURE — 93976 VASCULAR STUDY: CPT | Mod: 59

## 2020-06-03 PROCEDURE — 76856 US EXAM PELVIC COMPLETE: CPT

## 2020-06-03 PROCEDURE — 76830 TRANSVAGINAL US NON-OB: CPT

## 2020-06-04 DIAGNOSIS — R19.00 PELVIC MASS: Primary | ICD-10-CM

## 2020-09-22 ENCOUNTER — ANCILLARY PROCEDURE (OUTPATIENT)
Dept: ULTRASOUND IMAGING | Facility: CLINIC | Age: 50
End: 2020-09-22
Attending: OBSTETRICS & GYNECOLOGY
Payer: COMMERCIAL

## 2020-09-22 DIAGNOSIS — R19.00 PELVIC MASS: Primary | ICD-10-CM

## 2020-09-22 DIAGNOSIS — R19.00 PELVIC MASS: ICD-10-CM

## 2020-09-22 PROCEDURE — 76856 US EXAM PELVIC COMPLETE: CPT | Performed by: RADIOLOGY

## 2020-09-22 PROCEDURE — 76830 TRANSVAGINAL US NON-OB: CPT | Performed by: RADIOLOGY

## 2020-09-22 PROCEDURE — 93976 VASCULAR STUDY: CPT | Mod: 59 | Performed by: RADIOLOGY

## 2020-11-03 ENCOUNTER — VIRTUAL VISIT (OUTPATIENT)
Dept: PEDIATRICS | Facility: CLINIC | Age: 50
End: 2020-11-03
Payer: COMMERCIAL

## 2020-11-03 DIAGNOSIS — Z11.52 ENCOUNTER FOR SCREENING LABORATORY TESTING FOR COVID-19 VIRUS: Primary | ICD-10-CM

## 2020-11-03 PROCEDURE — 99213 OFFICE O/P EST LOW 20 MIN: CPT | Mod: 95 | Performed by: INTERNAL MEDICINE

## 2020-11-03 NOTE — PATIENT INSTRUCTIONS
Instructions for Patients  It is recommended that you have a test for coronavirus (COVID-19). This illness can cause fever, cough and trouble breathing. Many people get a mild case and get better on their own. Some people can get very sick.     Please follow these steps:    1. We will call to schedule your test.  2. A member of our care team will ask you some questions. Then, they will use a swab to collect samples from your nose and throat.     Our testing team will send you your test results.    How can I protect others?    Stay home and away from others (self-isolate) until:    You ve had no fever--and no medicine that reduces fever--for 1 full day (24 hours). And      Your other symptoms have resolved (gotten better). For example, your cough or breathing has improved. And     At least 10 days have passed since your symptoms started.    Stay at least 6 feet away from others. (If someone will drive you to your test, stay in the backseat, as far away from the  as you can.)     Don t go to work, school or anywhere else. When it s time for your test, go straight to the testing site. Don t make any stops on the way there or back.     Wash your hands and face often. Use soap and water.     Cover your mouth and nose with a mask, tissue or washcloth.     Don t touch anyone. No hugging, kissing or handshakes.    How can I take care of myself?    1. Get lots of rest. Drink extra fluids (unless a doctor has told you not to).     2. Take Tylenol (acetaminophen) for fever or pain. If you have liver or kidney problems, ask your family doctor if it's okay to take Tylenol.     Adults can take either:     650 mg (two 325 mg pills) every 4 to 6 hours, or     1,000 mg (two 500 mg pills) every 8 hours as needed.     Note: Don't take more than 3,000 mg in one day.   Acetaminophen is found in many medicines (both prescribed and over-the-counter medicines). Read all labels to be sure you don't take too much.   For children, check  the Tylenol bottle for the right dose. The dose is based on  the child's age or weight.    3. If you have other health problems (like cancer, heart failure, an organ transplant or severe kidney disease): Call your specialty clinic if you don't feel better in the next 2 days.    4. Know when to call 911: If your breathing is so bad that it keeps you from doing normal activities, call 911 or go to the emergency room. Tell them that you've been staying home and may have COVID-19.      Thank you for taking steps to prevent the spread of this virus.  o Limit your contact with others.  o Wear a simple mask to cover your cough.  o Wash your hands well and often.  o If you need medical care, go to [email or web address] or contact your health care provider.     For more about COVID-19 and caring for yourself at home, visit the CDC website at https://www.cdc.gov/coronavirus/2019-ncov/about/steps-when-sick.html.     To learn about care at [facility], please go to [email or web address].     [facility] clinical trials (COVID-19 research studies): clinicalaffairs.[facility]-clinical-trials.    Below are the COVID-19 hotlines at the [facility] ([facility]). Interpreters are available.     For health questions: Call 079-613-9663 or 1-528.514.9907 (7 a.m. to 7 p.m.)    For questions about schools and childcare: Call 673-474-8842 or 1-341.978.8103 (7 a.m. to 7 p.m.)

## 2020-11-03 NOTE — PROGRESS NOTES
"Deirdre Jesus is a 50 year old female who is being evaluated via a billable telephone visit.      The patient has been notified of following:     \"This telephone visit will be conducted via a call between you and your physician/provider. We have found that certain health care needs can be provided without the need for a physical exam.  This service lets us provide the care you need with a short phone conversation.  If a prescription is necessary we can send it directly to your pharmacy.  If lab work is needed we can place an order for that and you can then stop by our lab to have the test done at a later time.    Telephone visits are billed at different rates depending on your insurance coverage. During this emergency period, for some insurers they may be billed the same as an in-person visit.  Please reach out to your insurance provider with any questions.    If during the course of the call the physician/provider feels a telephone visit is not appropriate, you will not be charged for this service.\"    Patient has given verbal consent for Telephone visit?  Yes    What phone number would you like to be contacted at? [phone]    How would you like to obtain your AVS? Keith    Subjective     Deirdre Jesus is a 50 year old female who presents via phone visit today for the following health issues:    HPI       Concern for COVID-19 direct exposure  About how many days ago did these symptoms start? No symptoms  Is this your first visit for this illness? Yes  In the 14 days before your symptoms started, have you had close contact with someone with COVID-19 (Coronavirus)? No symptoms  Do you have a fever or chills? No  Are you having new or worsening difficulty breathing? No  Do you have new or worsening cough? No  Have you had any new or unexplained body aches? No    Have you experienced any of the following NEW symptoms?    Headache: No    Sore throat: No    Loss of taste or smell: No    Chest pain: " No    Diarrhea: No    Rash: No  What treatments have you tried? none  Who do you live with?  and son  Are you, or a household member, a healthcare worker or a ? No Cares for elderly mother  Do you live in a nursing home, group home, or shelter? No  Do you have a way to get food/medications if quarantined? Yes, I have a friend or family member who can help me.    Patient's  went hunting with his brother the weekend of October 23-25.  Towards the end of October 25, her 's brother David developed respiratory symptoms that subsequently progressed.  [place] [facility] [place].  Test result is not available but his symptoms are consistent with COVID-19.    Patient originally has plan to go to visit her mother [place] this weekend.  She does not have any symptoms.  She would like to get COVID-19 test done    Review of Systems   Constitutional, HEENT, cardiovascular, pulmonary, gi and gu systems are negative, except as otherwise noted.       Objective          Vitals:  No vitals were obtained today due to virtual visit.    healthy, alert and no distress  PSYCH: Alert and oriented times 3; coherent speech, normal   rate and volume, able to articulate logical thoughts, able   to abstract reason, no tangential thoughts, no hallucinations   or delusions  Her affect is normal  RESP: No cough, no audible wheezing, able to talk in full sentences  Remainder of exam unable to be completed due to telephone visits            Assessment/Plan:    Assessment & Plan     Deirdre was seen today for covid 19 testing.    Diagnoses and all orders for this visit:    Encounter for screening laboratory testing for COVID-19 virus  -     Asymptomatic COVID-19 Virus (Coronavirus) by PCR; Future      Discussed with patient that if her 's test comes back positive, even if her Covid test negative, she will still need to go under self quarantine for 14 days.  If her 's test is negative,  her test is negative, and her  can undergo quarantine not in the same household, she can visit her mother [place] however it is to the best interest of her mother that she remains in quarantine as well.    See Patient Instructions    Oneal Fonseca MD PhD  [facility]    Phone call duration:  5 minutes

## 2020-12-02 ENCOUNTER — ANCILLARY PROCEDURE (OUTPATIENT)
Dept: MAMMOGRAPHY | Facility: CLINIC | Age: 50
End: 2020-12-02
Attending: INTERNAL MEDICINE
Payer: COMMERCIAL

## 2020-12-02 DIAGNOSIS — Z12.31 VISIT FOR SCREENING MAMMOGRAM: ICD-10-CM

## 2020-12-02 PROCEDURE — 77067 SCR MAMMO BI INCL CAD: CPT | Mod: GC | Performed by: RADIOLOGY

## 2020-12-02 PROCEDURE — 77063 BREAST TOMOSYNTHESIS BI: CPT | Mod: GC | Performed by: RADIOLOGY

## 2020-12-18 ENCOUNTER — ANCILLARY PROCEDURE (OUTPATIENT)
Dept: MAMMOGRAPHY | Facility: CLINIC | Age: 50
End: 2020-12-18
Attending: INTERNAL MEDICINE
Payer: COMMERCIAL

## 2020-12-18 DIAGNOSIS — R92.8 ABNORMAL MAMMOGRAM: ICD-10-CM

## 2020-12-18 PROCEDURE — 77065 DX MAMMO INCL CAD UNI: CPT | Mod: LT | Performed by: RADIOLOGY

## 2021-01-02 DIAGNOSIS — R92.8 ABNORMAL MAMMOGRAM: ICD-10-CM

## 2021-01-02 LAB
SARS-COV-2 RNA SPEC QL NAA+PROBE: NORMAL
SPECIMEN SOURCE: NORMAL

## 2021-01-02 PROCEDURE — U0003 INFECTIOUS AGENT DETECTION BY NUCLEIC ACID (DNA OR RNA); SEVERE ACUTE RESPIRATORY SYNDROME CORONAVIRUS 2 (SARS-COV-2) (CORONAVIRUS DISEASE [COVID-19]), AMPLIFIED PROBE TECHNIQUE, MAKING USE OF HIGH THROUGHPUT TECHNOLOGIES AS DESCRIBED BY CMS-2020-01-R: HCPCS | Performed by: INTERNAL MEDICINE

## 2021-01-02 PROCEDURE — U0005 INFEC AGEN DETEC AMPLI PROBE: HCPCS | Performed by: INTERNAL MEDICINE

## 2021-01-03 LAB
LABORATORY COMMENT REPORT: NORMAL
SARS-COV-2 RNA SPEC QL NAA+PROBE: NEGATIVE
SPECIMEN SOURCE: NORMAL

## 2021-01-05 ENCOUNTER — ANCILLARY PROCEDURE (OUTPATIENT)
Dept: MAMMOGRAPHY | Facility: CLINIC | Age: 51
End: 2021-01-05
Attending: INTERNAL MEDICINE
Payer: COMMERCIAL

## 2021-01-05 DIAGNOSIS — R92.8 ABNORMAL MAMMOGRAM: ICD-10-CM

## 2021-01-05 PROCEDURE — 88305 TISSUE EXAM BY PATHOLOGIST: CPT | Performed by: RADIOLOGY

## 2021-01-05 PROCEDURE — 88360 TUMOR IMMUNOHISTOCHEM/MANUAL: CPT | Performed by: RADIOLOGY

## 2021-01-05 PROCEDURE — 19081 BX BREAST 1ST LESION STRTCTC: CPT | Mod: LT | Performed by: RADIOLOGY

## 2021-01-05 RX ORDER — LIDOCAINE HYDROCHLORIDE 10 MG/ML
9 INJECTION, SOLUTION EPIDURAL; INFILTRATION; INTRACAUDAL; PERINEURAL ONCE
Status: COMPLETED | OUTPATIENT
Start: 2021-01-05 | End: 2021-01-05

## 2021-01-05 RX ORDER — LIDOCAINE HYDROCHLORIDE AND EPINEPHRINE 10; 10 MG/ML; UG/ML
16 INJECTION, SOLUTION INFILTRATION; PERINEURAL ONCE
Status: COMPLETED | OUTPATIENT
Start: 2021-01-05 | End: 2021-01-05

## 2021-01-05 RX ADMIN — LIDOCAINE HYDROCHLORIDE 9 ML: 10 INJECTION, SOLUTION EPIDURAL; INFILTRATION; INTRACAUDAL; PERINEURAL at 14:58

## 2021-01-05 RX ADMIN — LIDOCAINE HYDROCHLORIDE AND EPINEPHRINE 16 ML: 10; 10 INJECTION, SOLUTION INFILTRATION; PERINEURAL at 14:58

## 2021-01-07 LAB — COPATH REPORT: NORMAL

## 2021-01-08 ENCOUNTER — PATIENT OUTREACH (OUTPATIENT)
Dept: SURGERY | Facility: CLINIC | Age: 51
End: 2021-01-08

## 2021-01-08 ENCOUNTER — TELEPHONE (OUTPATIENT)
Dept: GENERAL RADIOLOGY | Facility: CLINIC | Age: 51
End: 2021-01-08

## 2021-01-08 DIAGNOSIS — D05.12 DUCTAL CARCINOMA IN SITU (DCIS) OF LEFT BREAST: Primary | ICD-10-CM

## 2021-01-08 NOTE — TELEPHONE ENCOUNTER
Spoke with patient regarding left breast biopsy results, which indicate DCIS. Notified pt that the radiologist recommendation is surgical consulation. Pt verbalized understanding of these results and all questions answered to her satisfaction. Referral placed for surgical oncology.

## 2021-01-09 NOTE — TELEPHONE ENCOUNTER
New Patient Oncology Nurse Navigator Note     Referring provider: Dr. Fonseca     Referring Clinic/Organization: [facility]     Referred to: Surgical Oncology - Breast Surgery    Requested provider (if applicable): Dr. Sung Chiang    Referral Received: 01/08/21       Evaluation for : DCIS  - ER/VT (+)      Clinical History (per Nurse review of records provided):        Pathology:   FINAL DIAGNOSIS:   Breast, LEFT, calcifications, stereotactic core biopsy:   -Ductal carcinoma in situ (DCIS), nuclear grade 3, cribriform and micropapillary types, with comedonecrosis   -DCIS is estrogen receptor positive and progesterone receptor low positive by immunohistochemistry (see below)   -Calcifications associated with DCIS     Clinical Assessment / Barriers to Care (Per Nurse):    None at this time.       Records Location:     [facility]     Records Needed:     NONE AT THIS TIME      Additional testing needed prior to consult:     NONE AT THIS TIME    Referral updates and Plan:       Consult with Surgical Oncology     Called and spoke with patient regarding scheduling consult with Dr. Finley as referral noted, after further discussion she decided she would like to see Dr. Chiang for consult. Discussed appointment options and she was transferred to scheduling to finalize appointment details.     Shruti Khan, RN, BSN   Surgical Oncology New Patient Nurse Navigator  [facility]  [phone]

## 2021-01-14 ENCOUNTER — HEALTH MAINTENANCE LETTER (OUTPATIENT)
Age: 51
End: 2021-01-14

## 2021-01-21 ENCOUNTER — VIRTUAL VISIT (OUTPATIENT)
Dept: RADIATION THERAPY | Facility: OUTPATIENT CENTER | Age: 51
End: 2021-01-21
Attending: INTERNAL MEDICINE
Payer: COMMERCIAL

## 2021-01-21 DIAGNOSIS — D05.12 DUCTAL CARCINOMA IN SITU (DCIS) OF LEFT BREAST: Primary | ICD-10-CM

## 2021-01-21 NOTE — PROGRESS NOTES
VIDEO VISIT       HISTORY OF PRESENT ILLNESS:  Today, I had a video visit with Deirdre Jesus for a new diagnosis of left ductal carcinoma in situ.  On 12/18, she underwent a screening mammogram which revealed a small area of microcalcifications at the 6-7 o'clock position. I reviewed these films myself and with Dr. Sanchez and they show about a 5 mm area of microcalcifications with no other suspicious findings.  She underwent a stereotactic left breast biopsy which demonstrated grade 3 DCIS.  It was estrogen-receptor positive.  There was no evidence of invasive disease.  She is now here to talk about management options.  She denies any breast symptoms.  Has had no breast problems in the past.      PAST MEDICAL HISTORY:  Significant for no major medical problems.      FAMILY HISTORY:  Negative for breast cancer.      IMPRESSION:  Small area of DCIS, left breast.      PLAN:  I talked to her about the various treatment options.  I did not recommend a mastectomy and said that she would best be treated with a lumpectomy with or without radiation therapy.  I also talked to her about the role of endocrine therapy, so I would not recommend removal of any lymph nodes.  I would recommend that she undergo genetic testing.  She would like to have [facility].  We will arrange for her genetic testing to be done at [place] because it is closer to her home.      Total time was 45 minutes.  I spent 25 minutes talking to the patient and 20 minutes reviewing her mammograms and biopsies and discussing her case with Dr. Sanchez, her breast radiologist.

## 2021-01-21 NOTE — LETTER
1/21/2021         RE: Deirdre Jesus  [address] [place] [address]        Dear Colleague,    Thank you for referring your patient, Deirdre Jesus, [facility]. Please see a copy of my visit note below.    VIDEO VISIT       HISTORY OF PRESENT ILLNESS:  Today, I had a video visit with Deirdre Jesus for a new diagnosis of left ductal carcinoma in situ.  On 12/18, she underwent a screening mammogram which revealed a small area of microcalcifications at the 6-7 o'clock position. I reviewed these films myself and with Dr. Sanchez and they show about a 5 mm area of microcalcifications with no other suspicious findings.  She underwent a stereotactic left breast biopsy which demonstrated grade 3 DCIS.  It was estrogen-receptor positive.  There was no evidence of invasive disease.  She is now here to talk about management options.  She denies any breast symptoms.  Has had no breast problems in the past.      PAST MEDICAL HISTORY:  Significant for no major medical problems.      FAMILY HISTORY:  Negative for breast cancer.      IMPRESSION:  Small area of DCIS, left breast.      PLAN:  I talked to her about the various treatment options.  I did not recommend a mastectomy and said that she would best be treated with a lumpectomy with or without radiation therapy.  I also talked to her about the role of endocrine therapy, so I would not recommend removal of any lymph nodes.  I would recommend that she undergo genetic testing.  She would like to have a lumpectomy either at [facility] or at the [facility], whichever is available first.  We will arrange for her genetic testing to be done at [place] because it is closer to her home.      Total time was 45 minutes.  I spent 25 minutes talking to the patient and 20 minutes reviewing her mammograms and biopsies and discussing her case with Dr. Sanchez, her breast radiologist.         Oncology Rooming Note    January 21, 2021 11:07  "RACHEL Jesus is a 51 year old female who presents for:    Chief Complaint   Patient presents with     Oncology Clinic Visit     Surgical Oncology consult with Dr. Chiang     Initial Vitals: There were no vitals taken for this visit. Estimated body mass index is 22.78 kg/m  as calculated from the following:    Height as of 1/30/20: 1.74 m (5' 8.5\").    Weight as of 1/30/20: 68.9 kg (152 lb). There is no height or weight on file to calculate BSA.  Data Unavailable Comment: Data Unavailable   No LMP recorded. Patient is postmenopausal.  Allergies reviewed: Yes  Medications reviewed: Yes    Medications: Medication refills not needed today.  Pharmacy name entered into [employer] [facility]: [id number] - [place], [place] - [address] [address] [facility]    Clinical concerns: L breast DCIS  was notified.      Ana Paula Camarillo RN                  Again, thank you for allowing me to participate in the care of your patient.        Sincerely,        Sung Chiang MD    "

## 2021-01-21 NOTE — PROGRESS NOTES
"Oncology Rooming Note    January 21, 2021 11:07 AM   Deirdre Jesus is a 51 year old female who presents for:    Chief Complaint   Patient presents with     Oncology Clinic Visit     Surgical Oncology consult with Dr. Chiang     Initial Vitals: There were no vitals taken for this visit. Estimated body mass index is 22.78 kg/m  as calculated from the following:    Height as of 1/30/20: 1.74 m (5' 8.5\").    Weight as of 1/30/20: 68.9 kg (152 lb). There is no height or weight on file to calculate BSA.  Data Unavailable Comment: Data Unavailable   No LMP recorded. Patient is postmenopausal.  Allergies reviewed: Yes  Medications reviewed: Yes    Medications: Medication refills not needed today.  Pharmacy name entered into [employer]: [id number] [place], [place] - [address] [address] [facility]    Clinical concerns: L breast DCIS  was notified.      Ana Paula Camarillo RN              "

## 2021-01-22 DIAGNOSIS — D05.12 DUCTAL CARCINOMA IN SITU (DCIS) OF LEFT BREAST: Primary | ICD-10-CM

## 2021-01-22 DIAGNOSIS — D05.12 DUCTAL CARCINOMA IN SITU (DCIS) OF LEFT BREAST: ICD-10-CM

## 2021-01-22 PROCEDURE — 99N1104 PR STATISTIC DNA ISOL HIGH PURITY: Performed by: SURGERY

## 2021-01-25 LAB — COPATH REPORT: NORMAL

## 2021-01-26 ENCOUNTER — TELEPHONE (OUTPATIENT)
Dept: ONCOLOGY | Facility: CLINIC | Age: 51
End: 2021-01-26

## 2021-01-26 ENCOUNTER — OFFICE VISIT (OUTPATIENT)
Dept: FAMILY MEDICINE | Facility: CLINIC | Age: 51
End: 2021-01-26
Payer: COMMERCIAL

## 2021-01-26 VITALS
WEIGHT: 156 LBS | HEART RATE: 70 BPM | BODY MASS INDEX: 23.11 KG/M2 | OXYGEN SATURATION: 98 % | TEMPERATURE: 97.8 F | DIASTOLIC BLOOD PRESSURE: 81 MMHG | HEIGHT: 69 IN | SYSTOLIC BLOOD PRESSURE: 124 MMHG | RESPIRATION RATE: 14 BRPM

## 2021-01-26 DIAGNOSIS — R07.89 ATYPICAL CHEST PAIN: ICD-10-CM

## 2021-01-26 DIAGNOSIS — D05.12 DUCTAL CARCINOMA IN SITU (DCIS) OF LEFT BREAST: ICD-10-CM

## 2021-01-26 DIAGNOSIS — Z01.818 PREOP GENERAL PHYSICAL EXAM: Primary | ICD-10-CM

## 2021-01-26 DIAGNOSIS — R94.31 ABNORMAL ELECTROCARDIOGRAM: ICD-10-CM

## 2021-01-26 DIAGNOSIS — Z13.21 ENCOUNTER FOR VITAMIN DEFICIENCY SCREENING: ICD-10-CM

## 2021-01-26 LAB
ERYTHROCYTE [DISTWIDTH] IN BLOOD BY AUTOMATED COUNT: 12.8 % (ref 10–15)
HCT VFR BLD AUTO: 39.4 % (ref 35–47)
HGB BLD-MCNC: 13.2 G/DL (ref 11.7–15.7)
MCH RBC QN AUTO: 30.1 PG (ref 26.5–33)
MCHC RBC AUTO-ENTMCNC: 33.5 G/DL (ref 31.5–36.5)
MCV RBC AUTO: 90 FL (ref 78–100)
PLATELET # BLD AUTO: 261 10E9/L (ref 150–450)
RBC # BLD AUTO: 4.39 10E12/L (ref 3.8–5.2)
WBC # BLD AUTO: 5.8 10E9/L (ref 4–11)

## 2021-01-26 PROCEDURE — 36415 COLL VENOUS BLD VENIPUNCTURE: CPT | Performed by: NURSE PRACTITIONER

## 2021-01-26 PROCEDURE — 80048 BASIC METABOLIC PNL TOTAL CA: CPT | Performed by: NURSE PRACTITIONER

## 2021-01-26 PROCEDURE — 93000 ELECTROCARDIOGRAM COMPLETE: CPT | Performed by: NURSE PRACTITIONER

## 2021-01-26 PROCEDURE — 82306 VITAMIN D 25 HYDROXY: CPT | Performed by: NURSE PRACTITIONER

## 2021-01-26 PROCEDURE — 85027 COMPLETE CBC AUTOMATED: CPT | Performed by: NURSE PRACTITIONER

## 2021-01-26 PROCEDURE — 99214 OFFICE O/P EST MOD 30 MIN: CPT | Performed by: NURSE PRACTITIONER

## 2021-01-26 ASSESSMENT — MIFFLIN-ST. JEOR: SCORE: 1369.05

## 2021-01-26 NOTE — PATIENT INSTRUCTIONS
Before your surgery:  10 days before: stop all over the counter supplements  1 week before: stop aspirin if you are taking aspirin.   stop ibuprofen, naproxen or similar antiinflammatory medications. You may use Tylenol for pain or headache.     On the day of your surgery:  Do not take any medication the morning of your surgery.     After surgery:    You may resume all your medications after the surgery unless your surgeon instructs you otherwise      Preparing for Your Surgery  Getting started  A nurse will call you to review your health history and instructions. They will give you an arrival time based on your scheduled surgery time.  Please be ready to share the following:    Your doctor's clinic name and phone number    Your medical, surgical and anesthesia history    A list of allergies and sensitivities    A list of medicines, including herbal treatments and over-the-counter drugs    Whether the patient has a legal guardian (ask how to send us the papers in advance)  If you have a child who's having surgery, please ask for a copy of Preparing for Your Child's Surgery.    Preparing for surgery    Within 30 days of surgery: Have a pre-op exam (sometimes called an H&P, or History and Physical). This can be done at a clinic or pre-operative center.  ? If you're having a , you may not need this exam. Talk to your care team    At your pre-op exam, talk to your care team about all medicines you take. If you need to stop any medicines before surgery, ask when to start taking them again.  ? We do this for your safety. Many medicines can make you bleed too much during surgery. Some change how well surgery (anesthesia) drugs work.    Call your insurance company to let them know you're having surgery. (If you don't have insurance, call [phone].)    Call your clinic if there's any change in your health. This includes signs of a cold or flu (sore throat, runny nose, cough, rash, fever). It also includes a  scrape or scratch near the surgery site.    If you have questions on the day of surgery, call your hospital or surgery center.  Eating and drinking guidelines  For your safety: Unless your surgeon tells you otherwise, follow the guidelines below.    Eat and drink as usual until 8 hours before surgery. After that, no food or milk.    Drink clear liquids until 2 hours before surgery. These are liquids you can see through, like water, Gatorade and Propel Water. You may also have black coffee and tea (no cream or milk).    Nothing by mouth within 2 hours of surgery. This includes gum, candy and breath mints.    If you drink, stop drinking alcohol the night before surgery.    If your care team tells you to take medicine on the morning of surgery, it's okay to take it with a sip of water.  Preventing infection    Shower or bathe the night before and morning of your surgery. Follow the instructions your clinic gave you. (If no instructions, use regular soap.)    Don't shave or clip hair near your surgery site. We'll remove the hair if needed.    Don't smoke or vape the morning of surgery. You may chew nicotine gum up to 2 hours before surgery. A nicotine patch is okay.  ? Note: Some surgeries require you to completely quit smoking and nicotine. Check with your surgeon.    Your care team will make every effort to keep you safe from infection. We will:  ? Clean our hands often with soap and water (or an alcohol-based hand rub).  ? Clean the skin at your surgery site with a special soap that kills germs.  ? Give you a special gown to keep you warm. (Cold raises the risk of infection.)  ? Wear special hair covers, masks, gowns and gloves during surgery.  ? Give antibiotic medicine, if prescribed. Not all surgeries need antibiotics.  What to bring on the day of surgery    Photo ID and insurance card    Copy of your health care directive, if you have one    Glasses and hearing aides (bring cases)  ? You can't wear contacts  during surgery    Inhaler and eye drops, if you use them (tell us about these when you arrive)    CPAP machine or breathing device, if you use them    A few personal items, if spending the night    If you have . . .  ? A pacemaker or ICD (cardiac defibrillator): Bring the ID card.  ? An implanted stimulator: Bring the remote control.  ? A legal guardian: Bring a copy of the certified (court-stamped) guardianship papers.  Please remove any jewelry, including body piercings. Leave jewelry and other valuables at home.  If you're going home the day of surgery  Important: If you don't follow the rules below, we must cancel your surgery.     Arrange for someone to drive you home after surgery. You may not drive, take a taxi or take public transportation by yourself (unless you'll have local anesthesia only).    Arrange for a responsible adult to stay with you overnight. If you don't, we may keep you in the hospital overnight, and you may need to pay the costs yourself.  Questions?   If you have any questions for your care team, list them here: _________________________________________________________________________________________________________________________________________________________________________________________________________________________________________________________________________________________________________________________  For informational purposes only. Not to replace the advice of your health care provider. Copyright   2003, 2019 [facility]. All rights reserved. Clinically reviewed by Vijaya Tinajero MD. [employer] 295360 - REV 4/20.

## 2021-01-26 NOTE — PROGRESS NOTES
[address] [address]  Phone: [phone]  Primary Provider: Oneal Fonseca  Pre-op Performing Provider: JAMEE GRAF    PREOPERATIVE EVALUATION:  Today's date: 1/26/2021    Deirdre Jesus is a 51 year old female who presents for a preoperative evaluation.    Surgical Information:  Surgery/Procedure: Lumpectomy-left breast  Surgery Location: [facility]  Surgeon: Dr. Jung  Surgery Date: 2/3/2021  Time of Surgery: TBD  Where patient plans to recover: At home with family  Fax number for surgical facility: Note does not need to be faxed, will be available electronically in Epic.    Type of Anesthesia Anticipated: to be determined    Subjective     HPI related to upcoming procedure: found lump on left breast     Preop Questions 1/26/2021   1. Have you ever had a heart attack or stroke? No   2. Have you ever had surgery on your heart or blood vessels, such as a stent placement, a coronary artery bypass, or surgery on an artery in your head, neck, heart, or legs? No   3. Do you have chest pain with activity? No   4. Do you have a history of  heart failure? No   5. Do you currently have a cold, bronchitis or symptoms of other infection? No   6. Do you have a cough, shortness of breath, or wheezing? No   7. Do you or anyone in your family have previous history of blood clots? No   8. Do you or does anyone in your family have a serious bleeding problem such as prolonged bleeding following surgeries or cuts? No   9. Have you ever had problems with anemia or been told to take iron pills? No   10. Have you had any abnormal blood loss such as black, tarry or bloody stools, or abnormal vaginal bleeding? No   11. Have you ever had a blood transfusion? No   12. Are you willing to have a blood transfusion if it is medically needed before, during, or after your surgery? Yes   13. Have you or any of your relatives ever had problems with anesthesia? No   14. Do  you have sleep apnea, excessive snoring or daytime drowsiness? No   15. Do you have any artifical heart valves or other implanted medical devices like a pacemaker, defibrillator, or continuous glucose monitor? No   16. Do you have artificial joints? No   17. Are you allergic to latex? No   18. Is there any chance that you may be pregnant? No       Health Care Directive:  Patient does not have a Health Care Directive or Living Will: Discussed advance care planning with patient; however, patient declined at this time.    Preoperative Review of :   reviewed - no record of controlled substances prescribed.      Status of Chronic Conditions:  See problem list for active medical problems.  Problems all longstanding and stable, except as noted/documented.  See ROS for pertinent symptoms related to these conditions.      Review of Systems  CONSTITUTIONAL: NEGATIVE for fever, chills, change in weight  INTEGUMENTARY/SKIN: NEGATIVE for rash   ENT/MOUTH: NEGATIVE for ear, mouth and throat problems  RESP: NEGATIVE for significant cough or SOB  BREAST: POSITIVE for breast cancer   CV: NEGATIVE for palpitations or peripheral edema. Does admit will sometimes have some intermittent non specific chest pain   She is concerned as has a significant family hx of CAD   GI: NEGATIVE for nausea, abdominal pain, heartburn, or change in bowel habits  : NEGATIVE for frequency, dysuria, or hematuria  MUSCULOSKELETAL:POSITIVE  for right hand fracture in a splint and will be eventually needing surgery for this   and NEGATIVE for joint swelling  and joint warmth   NEURO: NEGATIVE for weakness, dizziness or paresthesias  ENDOCRINE: NEGATIVE for temperature intolerance, skin/hair changes  HEME: NEGATIVE for bleeding problems  PSYCHIATRIC: NEGATIVE for changes in mood or affect    Patient Active Problem List    Diagnosis Date Noted     Cyst of left ovary 10/24/2019     Priority: Medium     Familial hemochromatosis (H) 10/24/2019     Priority:  "Medium     Intramural leiomyoma of uterus 10/24/2019     Priority: Medium     Hyperlipidemia LDL goal <160 10/24/2019     Priority: Medium      No past medical history on file.  Past Surgical History:   Procedure Laterality Date     BUNIONECTOMY Bilateral      COLONOSCOPY WITH CO2 INSUFFLATION N/A 10/19/2018    Procedure: colonscopy;  Surgeon: Duane, William Charles, MD;  Location: [facility] OR     Current Outpatient Medications   Medication Sig Dispense Refill     Cholecalciferol (VITAMIN D PO)        multivitamin, therapeutic with minerals (MULTI-VITAMIN) TABS tablet Take 1 tablet by mouth daily         No Known Allergies     Social History     Tobacco Use     Smoking status: Never Smoker     Smokeless tobacco: Never Used   Substance Use Topics     Alcohol use: No     Family History   Problem Relation Age of Onset     Coronary Artery Disease Father 53        MI Fatal      Hyperlipidemia Brother      Hyperlipidemia Brother      Prostate Cancer Brother      Diabetes No family hx of      Colon Cancer No family hx of      Breast Cancer No family hx of      History   Drug Use No         Objective     /81   Pulse 70   Temp 97.8  F (36.6  C) (Oral)   Resp 14   Ht 1.74 m (5' 8.5\")   Wt 70.8 kg (156 lb)   SpO2 98%   BMI 23.37 kg/m      Physical Exam    GENERAL APPEARANCE: healthy, alert and no distress     EYES: Eyes grossly normal to inspection and conjunctivae and sclerae normal     HENT: ear canals and TM's normal and nose and mouth without ulcers or lesions     NECK: no adenopathy     RESP: lungs clear to auscultation - no rales, rhonchi or wheezes     CV: regular rates and rhythm, no murmur, click or rub, no irregular beats and color normal     ABDOMEN: soft, nontender     MS: gait normal and right hand in splint      SKIN: no suspicious lesions or rashes     NEURO: Normal strength and tone, sensory exam grossly normal, mentation intact and speech normal     PSYCH: mentation appears normal. and affect " normal/bright     LYMPHATICS: No cervical adenopathy      Diagnostics:  Recent Results (from the past 168 hour(s))   Basic metabolic panel    Collection Time: 01/26/21  6:03 PM   Result Value Ref Range    Sodium 139 133 - 144 mmol/L    Potassium 3.9 3.4 - 5.3 mmol/L    Chloride 105 94 - 109 mmol/L    Carbon Dioxide 30 20 - 32 mmol/L    Anion Gap 4 3 - 14 mmol/L    Glucose 80 70 - 99 mg/dL    Urea Nitrogen 18 7 - 30 mg/dL    Creatinine 0.69 0.52 - 1.04 mg/dL    GFR Estimate >90 >60 mL/min/[1.73_m2]    GFR Estimate If Black >90 >60 mL/min/[1.73_m2]    Calcium 9.3 8.5 - 10.1 mg/dL   CBC with platelets    Collection Time: 01/26/21  6:03 PM   Result Value Ref Range    WBC 5.8 4.0 - 11.0 10e9/L    RBC Count 4.39 3.8 - 5.2 10e12/L    Hemoglobin 13.2 11.7 - 15.7 g/dL    Hematocrit 39.4 35.0 - 47.0 %    MCV 90 78 - 100 fl    MCH 30.1 26.5 - 33.0 pg    MCHC 33.5 31.5 - 36.5 g/dL    RDW 12.8 10.0 - 15.0 %    Platelet Count 261 150 - 450 10e9/L   Vitamin D Deficiency    Collection Time: 01/26/21  6:03 PM   Result Value Ref Range    Vitamin D Deficiency screening 52 20 - 75 ug/L      Normal Sinus Rhythm, rate 62, no acute ST-T wave changes suggestive of ischemia, nonspecific T wave changes, there are no prior tracings available      Revised Cardiac Risk Index (RCRI):  The patient has the following serious cardiovascular risks for perioperative complications:   - No serious cardiac risks = 0 points     RCRI Interpretation: 0 points: Class I (very low risk - 0.4% complication rate)           Assessment & Plan   The proposed surgical procedure is considered INTERMEDIATE risk.    Deirdre was seen today for pre-op exam.    Diagnoses and all orders for this visit:    Preop general physical exam  -     Basic metabolic panel  -     CBC with platelets  -     EKG 12-lead complete w/read - Clinics  -     Echocardiogram Exercise Stress; Future    Ductal carcinoma in situ (DCIS) of left breast  -     Basic metabolic panel  -     CBC with  platelets  -     EKG 12-lead complete w/read - Clinics    Encounter for vitamin deficiency screening  -     Vitamin D Deficiency    Atypical chest pain  -     Echocardiogram Exercise Stress; Future    Abnormal electrocardiogram  -     Echocardiogram Exercise Stress; Future      Risks and Recommendations:  The patient has the following additional risks and recommendations for perioperative complications:  Cardiovascular:   - Pre-op stress imaging recommended due to current serious signs/symptoms that would warrant stress testing regardless of preoperative status  Stress test was completed and is normal   Echocardiogram Exercise Stress    Narrative    [id number]  [id number]  [id number]  [id number]^LESIA^JAMEE^KINSEY           [facility]  [facility]  [address] [place] [address]  Name: JULIA GERBER  MRN: [id number]  : 1970  Study Date: 2021 12:55 PM  Age: 51 yrs  Gender: Female  Patient Location: [facility]  Reason For Study: Preop general physical exam, Atypical chest pain, Abnormal  elect  Ordering Physician: JAMEE GRAF  Referring Physician: JAMEE GRAF  Performed By: Rico Elam RDCS     BSA: 1.8 m2  Height: 68 in  Weight: 156 lb  HR: 67  BP: 117/69 mmHg  _____________________________________________________________________________  __        Procedure  Stress Echo Bike with two dimensional, color and spectral Doppler performed.  Contrast Definity.  _____________________________________________________________________________  __        Interpretation Summary  Normal, low-risk exercise stress echocardiogram.  The target heart rate was achieved. Normal heart rate and BP response to  exercise.  Normal biventricular size, thickness, and global systolic function at  baseline, LVEF=55-60%.  With exercise, LVEF increased to >70% and LV cavity size decreased  appropriately.  No regional wall motion abnormalities are present at rest  or with exercise.  No angina was elicited.  No ECG evidence of ischemia.  Functional capacity is normal for age.  No significant valvular abnormalities are noted on screening Doppler exam.  The aortic root and visualized ascending aorta are normal.  _____________________________________________________________________________  __     Stress  Target Heart Rate was achieved.  Exercise was stopped due to fatigue.  The patient did not exhibit any symptoms during exercise.  Normal blood pressure response with stress.  Normal heart rate response to exercise.  Peak MVO2 29.7 ml/kg/min .  Percent predicted MVO2 107 %.  RPP 14410.  Maximum workload 150 max.     Stress Results                                       Maximum Predicted HR:   167 bpm             Target HR: 142 bpm        % Maximum Predicted HR: 97 %                             Stage  DurationHeart Rate  BP                                 (mm:ss)   (bpm)                         Baseline            67    117/69                           Peak    9:01     162    203/67                             Stress Duration:   9:01 mm:ss                       Maximum Stress HR: 162 bpm     Contrast  Definity (NDC #55216-166-98) given intravenously. Patient was given 5ml  mixture of 1.5ml Definity and 8.5ml saline. 5 ml wasted. IV start location  LAC .  _____________________________________________________________________________  __     MMode/2D Measurements & Calculations  asc Aorta Diam: 3.0 cm        Doppler Measurements & Calculations  PA acc time: 0.13 sec              _____________________________________________________________________________  __        Report approved by: Maida MALDONADO 01/29/2021 01:56 PM            Medication Instructions:  Patient is on no chronic medications    RECOMMENDATION:  APPROVAL GIVEN to proceed with proposed procedure, without further diagnostic evaluation.    Signed Electronically by: LIANG Arguelles CNP    Copy of this  evaluation report is provided to requesting physician.    Preop [facility] Preop Guidelines    Revised Cardiac Risk Index

## 2021-01-27 LAB
ANION GAP SERPL CALCULATED.3IONS-SCNC: 4 MMOL/L (ref 3–14)
BUN SERPL-MCNC: 18 MG/DL (ref 7–30)
CALCIUM SERPL-MCNC: 9.3 MG/DL (ref 8.5–10.1)
CHLORIDE SERPL-SCNC: 105 MMOL/L (ref 94–109)
CO2 SERPL-SCNC: 30 MMOL/L (ref 20–32)
CREAT SERPL-MCNC: 0.69 MG/DL (ref 0.52–1.04)
DEPRECATED CALCIDIOL+CALCIFEROL SERPL-MC: 52 UG/L (ref 20–75)
GFR SERPL CREATININE-BSD FRML MDRD: >90 ML/MIN/{1.73_M2}
GLUCOSE SERPL-MCNC: 80 MG/DL (ref 70–99)
POTASSIUM SERPL-SCNC: 3.9 MMOL/L (ref 3.4–5.3)
SODIUM SERPL-SCNC: 139 MMOL/L (ref 133–144)

## 2021-01-27 NOTE — RESULT ENCOUNTER NOTE
Navneet Jesus,    Attached are your test results.  -Normal red blood cell (hgb) levels, normal white blood cell count and normal platelet levels.   Please contact us if you have any questions.    Gabriela Vega, CNP

## 2021-01-28 ENCOUNTER — PREP FOR PROCEDURE (OUTPATIENT)
Dept: RADIATION THERAPY | Facility: OUTPATIENT CENTER | Age: 51
End: 2021-01-28

## 2021-01-28 ENCOUNTER — TELEPHONE (OUTPATIENT)
Dept: ONCOLOGY | Facility: CLINIC | Age: 51
End: 2021-01-28

## 2021-01-28 DIAGNOSIS — Z11.59 ENCOUNTER FOR SCREENING FOR OTHER VIRAL DISEASES: Primary | ICD-10-CM

## 2021-01-28 DIAGNOSIS — D05.12 DUCTAL CARCINOMA IN SITU (DCIS) OF LEFT BREAST: Primary | ICD-10-CM

## 2021-01-28 NOTE — RESULT ENCOUNTER NOTE
Navneet Jesus,    Attached are your test results.  -Normal red blood cell (hgb) levels, normal white blood cell count and normal platelet levels.  -Kidney function is normal (Cr, GFR), Sodium is normal, Potassium is normal, Calcium is normal, Glucose is normal.   -Vitamin D level is normal and getting 1000 IU daily in your diet or supplements is recommended.    Please contact us if you have any questions.    Gabriela Vega, CNP

## 2021-01-28 NOTE — TELEPHONE ENCOUNTER
I contacted the patient via phone and spoke with the patient  to confirm the scheduled dates and provide the following information:     Surgery is scheduled with Dr. Chiang on 2/1 at [facility].  Scheduled per surgeon.    COVID-19 test scheduled for 1/29    H&P: to be completed by already completed    POST-OP: 2/19    They are aware to arrive at 7:45 AM and check in on the 2nd floor of the breast center    The surgery packet was provided via [employer].

## 2021-01-29 ENCOUNTER — HOSPITAL ENCOUNTER (OUTPATIENT)
Dept: CARDIOLOGY | Facility: CLINIC | Age: 51
Discharge: HOME | End: 2021-01-29
Attending: NURSE PRACTITIONER | Admitting: NURSE PRACTITIONER
Payer: COMMERCIAL

## 2021-01-29 DIAGNOSIS — R07.89 ATYPICAL CHEST PAIN: ICD-10-CM

## 2021-01-29 DIAGNOSIS — Z11.59 ENCOUNTER FOR SCREENING FOR OTHER VIRAL DISEASES: ICD-10-CM

## 2021-01-29 DIAGNOSIS — Z01.818 PREOP GENERAL PHYSICAL EXAM: ICD-10-CM

## 2021-01-29 DIAGNOSIS — R94.31 ABNORMAL ELECTROCARDIOGRAM: ICD-10-CM

## 2021-01-29 LAB
LABORATORY COMMENT REPORT: NORMAL
SARS-COV-2 RNA RESP QL NAA+PROBE: NEGATIVE
SARS-COV-2 RNA RESP QL NAA+PROBE: NORMAL
SPECIMEN SOURCE: NORMAL
SPECIMEN SOURCE: NORMAL

## 2021-01-29 PROCEDURE — 93018 CV STRESS TEST I&R ONLY: CPT | Performed by: INTERNAL MEDICINE

## 2021-01-29 PROCEDURE — 93321 DOPPLER ECHO F-UP/LMTD STD: CPT | Mod: 26 | Performed by: INTERNAL MEDICINE

## 2021-01-29 PROCEDURE — U0003 INFECTIOUS AGENT DETECTION BY NUCLEIC ACID (DNA OR RNA); SEVERE ACUTE RESPIRATORY SYNDROME CORONAVIRUS 2 (SARS-COV-2) (CORONAVIRUS DISEASE [COVID-19]), AMPLIFIED PROBE TECHNIQUE, MAKING USE OF HIGH THROUGHPUT TECHNOLOGIES AS DESCRIBED BY CMS-2020-01-R: HCPCS | Performed by: SURGERY

## 2021-01-29 PROCEDURE — U0005 INFEC AGEN DETEC AMPLI PROBE: HCPCS | Performed by: SURGERY

## 2021-01-29 PROCEDURE — 255N000002 HC RX 255 OP 636: Performed by: INTERNAL MEDICINE

## 2021-01-29 PROCEDURE — 93325 DOPPLER ECHO COLOR FLOW MAPG: CPT | Mod: 26 | Performed by: INTERNAL MEDICINE

## 2021-01-29 PROCEDURE — 93016 CV STRESS TEST SUPVJ ONLY: CPT | Performed by: INTERNAL MEDICINE

## 2021-01-29 PROCEDURE — 93350 STRESS TTE ONLY: CPT | Mod: 26 | Performed by: INTERNAL MEDICINE

## 2021-01-29 PROCEDURE — 93325 DOPPLER ECHO COLOR FLOW MAPG: CPT | Mod: TC

## 2021-01-29 RX ADMIN — PERFLUTREN 5 ML: 6.52 INJECTION, SUSPENSION INTRAVENOUS at 13:07

## 2021-01-29 NOTE — RESULT ENCOUNTER NOTE
Navneet Jesus,    Attached are your test results.  Good news stress test is normal   Sent results to Dr Chiang as well    Please contact us if you have any questions.    Gabriela Vega, CNP

## 2021-02-01 ENCOUNTER — ANESTHESIA EVENT (OUTPATIENT)
Dept: SURGERY | Facility: CLINIC | Age: 51
End: 2021-02-01
Payer: COMMERCIAL

## 2021-02-01 ENCOUNTER — ANESTHESIA (OUTPATIENT)
Dept: SURGERY | Facility: CLINIC | Age: 51
End: 2021-02-01
Payer: COMMERCIAL

## 2021-02-01 ENCOUNTER — HOSPITAL ENCOUNTER (OUTPATIENT)
Facility: CLINIC | Age: 51
Discharge: HOME | End: 2021-02-01
Attending: SURGERY | Admitting: SURGERY
Payer: COMMERCIAL

## 2021-02-01 ENCOUNTER — ANCILLARY PROCEDURE (OUTPATIENT)
Dept: MAMMOGRAPHY | Facility: CLINIC | Age: 51
End: 2021-02-01
Attending: SURGERY
Payer: COMMERCIAL

## 2021-02-01 VITALS
OXYGEN SATURATION: 100 % | RESPIRATION RATE: 16 BRPM | WEIGHT: 154.1 LBS | DIASTOLIC BLOOD PRESSURE: 69 MMHG | TEMPERATURE: 97 F | BODY MASS INDEX: 22.82 KG/M2 | HEIGHT: 69 IN | HEART RATE: 65 BPM | SYSTOLIC BLOOD PRESSURE: 117 MMHG

## 2021-02-01 DIAGNOSIS — D05.12 DUCTAL CARCINOMA IN SITU (DCIS) OF LEFT BREAST: ICD-10-CM

## 2021-02-01 LAB — GLUCOSE BLDC GLUCOMTR-MCNC: 77 MG/DL (ref 70–99)

## 2021-02-01 PROCEDURE — 272N000001 HC OR GENERAL SUPPLY STERILE: Performed by: SURGERY

## 2021-02-01 PROCEDURE — 88305 TISSUE EXAM BY PATHOLOGIST: CPT | Mod: TC | Performed by: SURGERY

## 2021-02-01 PROCEDURE — 19281 PERQ DEVICE BREAST 1ST IMAG: CPT | Mod: LT | Performed by: RADIOLOGY

## 2021-02-01 PROCEDURE — 250N000009 HC RX 250: Performed by: SURGERY

## 2021-02-01 PROCEDURE — 250N000011 HC RX IP 250 OP 636: Performed by: SURGERY

## 2021-02-01 PROCEDURE — 99207 PR SATISFY VISIT NUMBER: CPT | Performed by: RADIOLOGY

## 2021-02-01 PROCEDURE — 88305 TISSUE EXAM BY PATHOLOGIST: CPT | Mod: 26 | Performed by: PATHOLOGY

## 2021-02-01 PROCEDURE — 76098 X-RAY EXAM SURGICAL SPECIMEN: CPT | Mod: LT | Performed by: RADIOLOGY

## 2021-02-01 PROCEDURE — 250N000009 HC RX 250: Performed by: NURSE ANESTHETIST, CERTIFIED REGISTERED

## 2021-02-01 PROCEDURE — 999N000141 HC STATISTIC PRE-PROCEDURE NURSING ASSESSMENT: Performed by: SURGERY

## 2021-02-01 PROCEDURE — 258N000003 HC RX IP 258 OP 636: Performed by: ANESTHESIOLOGY

## 2021-02-01 PROCEDURE — 88307 TISSUE EXAM BY PATHOLOGIST: CPT | Mod: 26 | Performed by: PATHOLOGY

## 2021-02-01 PROCEDURE — 250N000011 HC RX IP 250 OP 636: Performed by: NURSE ANESTHETIST, CERTIFIED REGISTERED

## 2021-02-01 PROCEDURE — 88307 TISSUE EXAM BY PATHOLOGIST: CPT | Mod: TC | Performed by: SURGERY

## 2021-02-01 PROCEDURE — 370N000017 HC ANESTHESIA TECHNICAL FEE, PER MIN: Performed by: SURGERY

## 2021-02-01 PROCEDURE — 250N000013 HC RX MED GY IP 250 OP 250 PS 637: Performed by: ANESTHESIOLOGY

## 2021-02-01 PROCEDURE — 999N001017 HC STATISTIC GLUCOSE BY METER IP

## 2021-02-01 PROCEDURE — 360N000082 HC SURGERY LEVEL 2 W/ FLUORO, PER MIN: Performed by: SURGERY

## 2021-02-01 PROCEDURE — 710N000012 HC RECOVERY PHASE 2, PER MINUTE: Performed by: SURGERY

## 2021-02-01 RX ORDER — OXYCODONE HYDROCHLORIDE 5 MG/1
5 TABLET ORAL EVERY 4 HOURS PRN
Status: DISCONTINUED | OUTPATIENT
Start: 2021-02-01 | End: 2021-02-01 | Stop reason: HOSPADM

## 2021-02-01 RX ORDER — ACETAMINOPHEN 325 MG/1
975 TABLET ORAL ONCE
Status: COMPLETED | OUTPATIENT
Start: 2021-02-01 | End: 2021-02-01

## 2021-02-01 RX ORDER — NALOXONE HYDROCHLORIDE 0.4 MG/ML
0.2 INJECTION, SOLUTION INTRAMUSCULAR; INTRAVENOUS; SUBCUTANEOUS
Status: DISCONTINUED | OUTPATIENT
Start: 2021-02-01 | End: 2021-02-01 | Stop reason: HOSPADM

## 2021-02-01 RX ORDER — CEFAZOLIN SODIUM 2 G/100ML
2 INJECTION, SOLUTION INTRAVENOUS
Status: COMPLETED | OUTPATIENT
Start: 2021-02-01 | End: 2021-02-01

## 2021-02-01 RX ORDER — SODIUM CHLORIDE, SODIUM LACTATE, POTASSIUM CHLORIDE, CALCIUM CHLORIDE 600; 310; 30; 20 MG/100ML; MG/100ML; MG/100ML; MG/100ML
INJECTION, SOLUTION INTRAVENOUS CONTINUOUS
Status: DISCONTINUED | OUTPATIENT
Start: 2021-02-01 | End: 2021-02-01 | Stop reason: HOSPADM

## 2021-02-01 RX ORDER — NALOXONE HYDROCHLORIDE 0.4 MG/ML
0.4 INJECTION, SOLUTION INTRAMUSCULAR; INTRAVENOUS; SUBCUTANEOUS
Status: DISCONTINUED | OUTPATIENT
Start: 2021-02-01 | End: 2021-02-01 | Stop reason: HOSPADM

## 2021-02-01 RX ORDER — KETOROLAC TROMETHAMINE 30 MG/ML
30 INJECTION, SOLUTION INTRAMUSCULAR; INTRAVENOUS EVERY 6 HOURS PRN
Status: DISCONTINUED | OUTPATIENT
Start: 2021-02-01 | End: 2021-02-01 | Stop reason: HOSPADM

## 2021-02-01 RX ORDER — HYDROMORPHONE HYDROCHLORIDE 1 MG/ML
.3-.5 INJECTION, SOLUTION INTRAMUSCULAR; INTRAVENOUS; SUBCUTANEOUS EVERY 10 MIN PRN
Status: DISCONTINUED | OUTPATIENT
Start: 2021-02-01 | End: 2021-02-01 | Stop reason: HOSPADM

## 2021-02-01 RX ORDER — PROPOFOL 10 MG/ML
INJECTION, EMULSION INTRAVENOUS PRN
Status: DISCONTINUED | OUTPATIENT
Start: 2021-02-01 | End: 2021-02-01

## 2021-02-01 RX ORDER — PROPOFOL 10 MG/ML
INJECTION, EMULSION INTRAVENOUS CONTINUOUS PRN
Status: DISCONTINUED | OUTPATIENT
Start: 2021-02-01 | End: 2021-02-01

## 2021-02-01 RX ORDER — ONDANSETRON 4 MG/1
4 TABLET, ORALLY DISINTEGRATING ORAL EVERY 30 MIN PRN
Status: DISCONTINUED | OUTPATIENT
Start: 2021-02-01 | End: 2021-02-01 | Stop reason: HOSPADM

## 2021-02-01 RX ORDER — LIDOCAINE 40 MG/G
CREAM TOPICAL
Status: DISCONTINUED | OUTPATIENT
Start: 2021-02-01 | End: 2021-02-01 | Stop reason: HOSPADM

## 2021-02-01 RX ORDER — ALBUTEROL SULFATE 0.83 MG/ML
2.5 SOLUTION RESPIRATORY (INHALATION) EVERY 4 HOURS PRN
Status: DISCONTINUED | OUTPATIENT
Start: 2021-02-01 | End: 2021-02-01 | Stop reason: HOSPADM

## 2021-02-01 RX ORDER — ONDANSETRON 2 MG/ML
4 INJECTION INTRAMUSCULAR; INTRAVENOUS EVERY 30 MIN PRN
Status: DISCONTINUED | OUTPATIENT
Start: 2021-02-01 | End: 2021-02-01 | Stop reason: HOSPADM

## 2021-02-01 RX ORDER — CEFAZOLIN SODIUM 1 G/3ML
1 INJECTION, POWDER, FOR SOLUTION INTRAMUSCULAR; INTRAVENOUS SEE ADMIN INSTRUCTIONS
Status: DISCONTINUED | OUTPATIENT
Start: 2021-02-01 | End: 2021-02-01 | Stop reason: HOSPADM

## 2021-02-01 RX ORDER — MEPERIDINE HYDROCHLORIDE 25 MG/ML
12.5 INJECTION INTRAMUSCULAR; INTRAVENOUS; SUBCUTANEOUS
Status: DISCONTINUED | OUTPATIENT
Start: 2021-02-01 | End: 2021-02-01 | Stop reason: HOSPADM

## 2021-02-01 RX ORDER — FENTANYL CITRATE 50 UG/ML
INJECTION, SOLUTION INTRAMUSCULAR; INTRAVENOUS PRN
Status: DISCONTINUED | OUTPATIENT
Start: 2021-02-01 | End: 2021-02-01

## 2021-02-01 RX ORDER — LIDOCAINE HYDROCHLORIDE 20 MG/ML
INJECTION, SOLUTION INFILTRATION; PERINEURAL PRN
Status: DISCONTINUED | OUTPATIENT
Start: 2021-02-01 | End: 2021-02-01

## 2021-02-01 RX ORDER — FENTANYL CITRATE 50 UG/ML
25-50 INJECTION, SOLUTION INTRAMUSCULAR; INTRAVENOUS
Status: DISCONTINUED | OUTPATIENT
Start: 2021-02-01 | End: 2021-02-01 | Stop reason: HOSPADM

## 2021-02-01 RX ADMIN — PROPOFOL 20 MG: 10 INJECTION, EMULSION INTRAVENOUS at 13:35

## 2021-02-01 RX ADMIN — CEFAZOLIN 2 G: 10 INJECTION, POWDER, FOR SOLUTION INTRAVENOUS at 12:34

## 2021-02-01 RX ADMIN — PROPOFOL 100 MCG/KG/MIN: 10 INJECTION, EMULSION INTRAVENOUS at 12:26

## 2021-02-01 RX ADMIN — SODIUM CHLORIDE, POTASSIUM CHLORIDE, SODIUM LACTATE AND CALCIUM CHLORIDE: 600; 310; 30; 20 INJECTION, SOLUTION INTRAVENOUS at 12:19

## 2021-02-01 RX ADMIN — FENTANYL CITRATE 50 MCG: 50 INJECTION, SOLUTION INTRAMUSCULAR; INTRAVENOUS at 12:29

## 2021-02-01 RX ADMIN — MIDAZOLAM 2 MG: 1 INJECTION INTRAMUSCULAR; INTRAVENOUS at 12:19

## 2021-02-01 RX ADMIN — FENTANYL CITRATE 25 MCG: 50 INJECTION, SOLUTION INTRAMUSCULAR; INTRAVENOUS at 13:07

## 2021-02-01 RX ADMIN — LIDOCAINE HYDROCHLORIDE 40 MG: 20 INJECTION, SOLUTION INFILTRATION; PERINEURAL at 12:26

## 2021-02-01 RX ADMIN — FENTANYL CITRATE 25 MCG: 50 INJECTION, SOLUTION INTRAMUSCULAR; INTRAVENOUS at 13:08

## 2021-02-01 RX ADMIN — ACETAMINOPHEN 975 MG: 325 TABLET, FILM COATED ORAL at 10:28

## 2021-02-01 RX ADMIN — FENTANYL CITRATE 25 MCG: 50 INJECTION, SOLUTION INTRAMUSCULAR; INTRAVENOUS at 13:38

## 2021-02-01 ASSESSMENT — MIFFLIN-ST. JEOR: SCORE: 1360.44

## 2021-02-01 NOTE — OR NURSING
Patient recently had cast removed for a broken right hand.  Right hand and forearm slightly swollen.  ROM limited in right fingers/hand,  strength is weaker on the right versus left.  Patient denies numbness or tingling in right extremity.

## 2021-02-01 NOTE — BRIEF OP NOTE
[facility]     Brief Operative Note    Pre-operative diagnosis: Ductal carcinoma in situ (DCIS) of left breast [D05.12]  Post-operative diagnosis Same as pre-operative diagnosis    Procedure: Procedure(s):  Left wire localized lumpectomy  Surgeon: Surgeon(s) and Role:     * Sung Chiang MD - Primary     * Yolanda Ortiz MD - Resident - Assisting  Anesthesia: General   Estimated blood loss: 5mL  Drains: None  Specimens:   ID Type Source Tests Collected by Time Destination   A : Left wired lumpectomy Tissue Breast, Left SURGICAL PATHOLOGY EXAM Sung Chiang MD 2/1/2021  1:18 PM    B : New anterior Margin (stitch at margin) Tissue Breast, Left SURGICAL PATHOLOGY EXAM Sung Chiang MD 2/1/2021  1:20 PM    C : New Medial Margin (stitch @ margin) Tissue Breast, Left SURGICAL PATHOLOGY EXAM Sung Chiang MD 2/1/2021  1:23 PM    D : New SUperior Margin (stitch at new margin) Tissue Breast, Left SURGICAL PATHOLOGY EXAM Sung Chiang MD 2/1/2021  1:27 PM    E : New Inferior Margin (stitch at new margin) Tissue Breast, Left SURGICAL PATHOLOGY EXAM Sung Chiang MD 2/1/2021  1:29 PM      Findings:   Wire localized lumpectomy wtih new anterior, medial, superior and inferior margins removed .  Complications: None.  Implants: * No implants in log *    Yolanda Ortiz MD  PGY-3 General Surgery

## 2021-02-01 NOTE — ANESTHESIA CARE TRANSFER NOTE
Patient: Deirdre Jesus    Procedure(s):  Left wire localized lumpectomy    Diagnosis: Ductal carcinoma in situ (DCIS) of left breast [D05.12]  Diagnosis Additional Information: No value filed.    Anesthesia Type:   MAC     Note:    Oropharynx: spontaneously breathing  Level of Consciousness: awake  Oxygen Supplementation: room air    Independent Airway: airway patency satisfactory and stable  Dentition: dentition unchanged  Vital Signs Stable: post-procedure vital signs reviewed and stable  Report to RN Given: handoff report given  Patient transferred to: Phase II  Comments: Patient alert and comfortable.  Handoff Report: Identifed the Patient, Identified the Reponsible Provider, Reviewed the pertinent medical history, Discussed the surgical course, Reviewed Intra-OP anesthesia mangement and issues during anesthesia, Set expectations for post-procedure period and Allowed opportunity for questions and acknowledgement of understanding      Vitals: (Last set prior to Anesthesia Care Transfer)  CRNA VITALS  2/1/2021 1315 - 2/1/2021 1353      2/1/2021             EKG:  Sinus rhythm        Electronically Signed By: LIANG Bernal CRNA  February 1, 2021  1:53 PM

## 2021-02-01 NOTE — DISCHARGE INSTRUCTIONS
[facility]  Same-Day Surgery   Adult Discharge Orders & Instructions     For 24 hours after surgery    1. Get plenty of rest.  A responsible adult must stay with you for at least 24 hours after you leave the hospital.   2. Do not drive or use heavy equipment.  If you have weakness or tingling, don't drive or use heavy equipment until this feeling goes away.  3. Do not drink alcohol.  4. Avoid strenuous or risky activities.  Ask for help when climbing stairs.   5. You may feel lightheaded.  IF so, sit for a few minutes before standing.  Have someone help you get up.   6. If you have nausea (feel sick to your stomach): Drink only clear liquids such as apple juice, ginger ale, broth or 7-Up.  Rest may also help.  Be sure to drink enough fluids.  Move to a regular diet as you feel able.  7. You may have a slight fever. Call the doctor if your fever is over 100 F (37.7 C) (taken under the tongue) or lasts longer than 24 hours.  8. You may have a dry mouth, a sore throat, muscle aches or trouble sleeping.  These should go away after 24 hours.  9. Do not make important or legal decisions.   Call your doctor for any of the followin.  Signs of infection (fever, growing tenderness at the surgery site, a large amount of drainage or bleeding, severe pain, foul-smelling drainage, redness, swelling).    2. It has been over 8 to 10 hours since surgery and you are still not able to urinate (pass water).    3.  Headache for over 24 hours.    4.  Numbness, tingling or weakness the day after surgery (if you had spinal anesthesia).  To contact a doctor, call Dr Chiang's office at [phone] ([facility] clinic) or:        [phone] and ask for the resident on call for   General surgery  (answered 24 hours a day)      Emergency Department:    [facility]: [phone]       (TTY for hearing impaired: [phone])    [facility]: [phone]       (TTY for hearing impaired:  [phone])

## 2021-02-01 NOTE — ANESTHESIA PREPROCEDURE EVALUATION
Anesthesia Pre-Procedure Evaluation    Patient: Deirdre Jesus   MRN: [id number] : 1970        Preoperative Diagnosis: Ductal carcinoma in situ (DCIS) of left breast [D05.12]   Procedure : Procedure(s):  Left wire localized lumpectomy     History reviewed. No pertinent past medical history.   Past Surgical History:   Procedure Laterality Date     BIOPSY  01    breast biopsy     BUNIONECTOMY Bilateral      COLONOSCOPY  2018     COLONOSCOPY WITH CO2 INSUFFLATION N/A 10/19/2018    Procedure: colonscopy;  Surgeon: Duane, William Charles, MD;  Location: [facility] OR     ORTHOPEDIC SURGERY      bunion surgery      No Known Allergies   Social History     Tobacco Use     Smoking status: Never Smoker     Smokeless tobacco: Never Used   Substance Use Topics     Alcohol use: No      Wt Readings from Last 1 Encounters:   21 69.9 kg (154 lb 1.6 oz)        Anesthesia Evaluation   Pt has had prior anesthetic.     No history of anesthetic complications       ROS/MED HX  ENT/Pulmonary:  - neg pulmonary ROS     Neurologic:  - neg neurologic ROS     Cardiovascular:     (+) Dyslipidemia -----Previous cardiac testing   Echo: Date: Results:    Stress Test: Date:  Results:  Interpretation Summary  Normal, low-risk exercise stress echocardiogram.  The target heart rate was achieved. Normal heart rate and BP response to  exercise.  Normal biventricular size, thickness, and global systolic function at  baseline, LVEF=55-60%.  With exercise, LVEF increased to >70% and LV cavity size decreased  appropriately.  No regional wall motion abnormalities are present at rest or with exercise.  No angina was elicited.  No ECG evidence of ischemia.  Functional capacity is normal for age.  No significant valvular abnormalities are noted on screening Doppler exam.  The aortic root and visualized ascending aorta are normal.  ECG Reviewed: Date: Results:    Cath: Date: Results:      METS/Exercise Tolerance: >4 METS    Hematologic:  - neg  hematologic  ROS     Musculoskeletal:  - neg musculoskeletal ROS     GI/Hepatic:  - neg GI/hepatic ROS  (-) GERD   Renal/Genitourinary:  - neg Renal ROS     Endo:  - neg endo ROS     Psychiatric/Substance Use:  - neg psychiatric ROS     Infectious Disease:  - neg infectious disease ROS     Malignancy:   (+) Malignancy, History of Breast.    Other:  - neg other ROS          Physical Exam    Airway        Mallampati: I   TM distance: > 3 FB   Neck ROM: full   Mouth opening: > 3 cm    Respiratory Devices and Support         Dental  no notable dental history         Cardiovascular             Pulmonary                   OUTSIDE LABS:  CBC:   Lab Results   Component Value Date    WBC 5.8 01/26/2021    WBC 6.0 09/13/2018    HGB 13.2 01/26/2021    HGB 13.5 09/13/2018    HCT 39.4 01/26/2021    HCT 40.9 09/13/2018     01/26/2021     09/13/2018     BMP:   Lab Results   Component Value Date     01/26/2021    POTASSIUM 3.9 01/26/2021    CHLORIDE 105 01/26/2021    CO2 30 01/26/2021    BUN 18 01/26/2021    CR 0.69 01/26/2021    GLC 80 01/26/2021     COAGS: No results found for: PTT, INR, FIBR  POC:   Lab Results   Component Value Date    BGM 77 02/01/2021     HEPATIC: No results found for: ALBUMIN, PROTTOTAL, ALT, AST, GGT, ALKPHOS, BILITOTAL, BILIDIRECT, DARWIN  OTHER:   Lab Results   Component Value Date    OBDULIO 9.3 01/26/2021    CRP <2.9 08/09/2017    SED 6 08/09/2017       Anesthesia Plan     History & Physical Review      ASA Status:  2. NPO Status:  NPO Appropriate. .  Plan for MAC with Intravenous and Propofol induction. Maintenance will be TIVA. Reason for MAC:  Procedure to face, neck, head or breast.         PONV prophylaxis:  Ondansetron (or other 5HT-3).    Comments: General LMA as back-up discussed..       Consents  Anesthesia Plan(s) and associated risks, benefits, and realistic alternatives discussed.    Questions answered and patient/representative(s) expressed understanding.    Discussed with:   Patient.       Extended Intubation/Ventilatory Support Discussed No No     Use of blood products discussed: No.          Postoperative Care  Postoperative pain management: IV analgesics and Oral pain medications.           Fredy Lopez MD

## 2021-02-01 NOTE — ANESTHESIA POSTPROCEDURE EVALUATION
Patient: Deirdre Jesus    Procedure(s):  Left wire localized lumpectomy    Diagnosis:Ductal carcinoma in situ (DCIS) of left breast [D05.12]  Diagnosis Additional Information: No value filed.    Anesthesia Type:  MAC    Note:  Disposition: Outpatient   Postop Pain Control: Uneventful            Sign Out: Well controlled pain   PONV: No   Neuro/Psych: Uneventful            Sign Out: Acceptable/Baseline neuro status   Airway/Respiratory: Uneventful            Sign Out: Acceptable/Baseline resp. status   CV/Hemodynamics: Uneventful            Sign Out: Acceptable CV status   Other NRE: NONE   DID A NON-ROUTINE EVENT OCCUR?          Last vitals:  Vitals:    02/01/21 1413 02/01/21 1415 02/01/21 1430   BP:  119/75 117/69   Pulse:      Resp:  16 16   Temp: 36.1  C (97  F)  36.1  C (97  F)   SpO2:          Electronically Signed By: Fredy Lopez MD  February 1, 2021  2:52 PM

## 2021-02-01 NOTE — PROGRESS NOTES
SBAR Wire Localization     SITUATION:  Patient to breast imaging center for imaging guided wire localizations before breast lumpectomy or excision biopsy without sentinel node injection.    BACKGROUND:  Breast imaging cancer, breast abnormality  Ordered procedure completed: Yes  Special needs identified: Yes     ASSESSMENT:  SBAR report called to patient care unit because of unexpected event in radiology: No  Allergies and medication list reviewed prior to procedure. Yes  Skin cleansed with ChloraPrep One-Step.  Anesthesia: approximately 6ml of 1% Lidocaine injection subcutaneous before wire insertion administered by the radiologist.   Gauze dressing over insertion site(s).  Post procedure mammogram completed: Yes    Patient tolerance: Tolerated procedure without issue    RECOMMENDATIONS:  Patient transferred to Unit 3c in stable condition via wheelchair with Transport Services.    Please call [facility] [phone] if there are any questions.

## 2021-02-01 NOTE — OP NOTE
Procedure Date: 2021      PREOPERATIVE DIAGNOSIS:  Ductal carcinoma in situ, left breast.      POSTOPERATIVE DIAGNOSIS:  Ductal carcinoma in situ, left breast.      PROCEDURE PERFORMED:  Wire localized lumpectomy.      ATTENDING SURGEON:  Sung Chiang MD      RESIDENT SURGEON:  Yolanda Ortiz MD      ANESTHESIA:  Local with IV sedation.      INDICATIONS FOR SURGERY:  The patient is a 51-year-old woman who was diagnosed with a small area of DCIS.  She now presents for surgical treatment.  A single guidewire was placed into the breast in the Breast Center.      PROCEDURE IN DETAIL:  After informed consent, the patient was brought to the operating room, given IV sedation and prepped and draped in the usual fashion.  I administered a local anesthetic to the lower portion of her breast.  I then made an incision centered at the 6 o'clock position at the inframammary fold and sharply raised an anterior skin flap superior to the guidewire.  I then identified the guidewire laterally and removed it from the skin and left it within the breast tissue.  I then dissected circumferentially around the guidewire to ensure adequate margins.  The specimen was removed, oriented and sent to the Breast Center.  I did excise a new medial, anterior, inferior and lateral margin.  Specimen radiograph demonstrated excision of the clip, but the clip was at the medial aspect of the incision.  Surgical clips were placed in all 4 quadrants of the resection bed.  We did excise the posterior fascia.  The dermis was closed with interrupted 3-0 Vicryl suture.  The skin was closed with a running 4-0 PDS subcuticular stitch.  Dermabond was placed and the patient was taken to recovery room in stable condition.         SUNG CHIANG MD             D: 2021   T: 2021   MT: JUNIOR      Name:     JULIA GERBER   MRN:      [id number]        Account:        [id number]   :      1970           Procedure Date: 2021       Document: [id number]

## 2021-02-01 NOTE — PROGRESS NOTES
Discharge instructions reviewed with Ariel via phone. Discussed care with Dr. Ortiz and patient okay to discharge.

## 2021-02-02 DIAGNOSIS — D05.12 DUCTAL CARCINOMA IN SITU (DCIS) OF LEFT BREAST: Primary | ICD-10-CM

## 2021-02-02 PROCEDURE — 81406 MOPATH PROCEDURE LEVEL 7: CPT | Performed by: SURGERY

## 2021-02-02 PROCEDURE — 81479 UNLISTED MOLECULAR PATHOLOGY: CPT | Performed by: SURGERY

## 2021-02-02 PROCEDURE — 81321 PTEN GENE FULL SEQUENCE: CPT | Performed by: SURGERY

## 2021-02-02 PROCEDURE — 81405 MOPATH PROCEDURE LEVEL 6: CPT | Performed by: SURGERY

## 2021-02-02 PROCEDURE — 81162 BRCA1&2 GEN FULL SEQ DUP/DEL: CPT | Performed by: SURGERY

## 2021-02-02 PROCEDURE — 81408 MOPATH PROCEDURE LEVEL 9: CPT | Performed by: SURGERY

## 2021-02-02 PROCEDURE — 81307 PALB2 GENE FULL GENE SEQ: CPT | Performed by: SURGERY

## 2021-02-02 PROCEDURE — G0452 MOLECULAR PATHOLOGY INTERPR: HCPCS | Mod: 26 | Performed by: PATHOLOGY

## 2021-02-02 PROCEDURE — 81323 PTEN GENE DUP/DELET VARIANT: CPT | Performed by: SURGERY

## 2021-02-03 LAB — COPATH REPORT: NORMAL

## 2021-02-05 ENCOUNTER — VIRTUAL VISIT (OUTPATIENT)
Dept: ONCOLOGY | Facility: CLINIC | Age: 51
End: 2021-02-05
Attending: SURGERY
Payer: COMMERCIAL

## 2021-02-05 DIAGNOSIS — D05.12 DUCTAL CARCINOMA IN SITU (DCIS) OF LEFT BREAST: Primary | ICD-10-CM

## 2021-02-05 DIAGNOSIS — Z80.42 FAMILY HISTORY OF PROSTATE CANCER: ICD-10-CM

## 2021-02-05 PROCEDURE — 96040 HC GENETIC COUNSELING, EACH 30 MINUTES: CPT | Mod: GT | Performed by: GENETIC COUNSELOR, MS

## 2021-02-05 NOTE — PROGRESS NOTES
2/5/2021    Referring Provider: Sung Chiang MD    Presenting Information:   I met with Deirdre Jesus and her  today for genetic counseling over video visit as part of the Cancer Risk Management Program.  She is here today to review the results from her previous genetic testing, recent diagnosis of DCIS, and family history of cancer.     Personal History:  Deirdre is a 51 year old female. She was diagnosed with left breast DCIS at age 50 treated with lumpectomy.  Breast Actionable genetic testing was ordered for treatment decision making which was normal/negative.  These results were reviewed today, and a copy of the report is available through [employer].     Breast Actionable Panel: NEGATIVE. Next generation sequencing and copy number variation analysis of: EASTON, BRCA1, BRCA2, CDH1, CHEK2, NBN, NF1, PALB2, PTEN, STK11, TP53.  No pathogenic mutations or variants of uncertain significance were detected in the 11 genes analyzed.     Deirdre has her ovaries, fallopian tubes and uterus in place.  Her most recent colonoscopy from 2018 was normal, and follow up was recommended in 10 years.  She has been followed with transvaginal ultrasounds (most recent 9/22/2020) for a left ovarian lesion.      Family History: (Please see scanned pedigree for detailed family history information)    Her brother was diagnosed with an early stage prostate cancer at age 60.      She reports that her paternal grandmother passed away in her 60's due to cancer (primary cancer diagnosis not known)    Her maternal ethnicity is [place]. Her paternal ethnicity is [place].  There is no known Ashkenazi Evangelical ancestry on either side of her family.     Discussion:    Deirdre has a recent diagnosis of left breast DCIS treated with lumpectomy.  Breast Actionable genetic testing was ordered by her provider for treatment decision making.      Breast Actionable Panel. Next generation sequencing and copy number variation analysis of: EASTON, BRCA1, BRCA2,  CDH1, CHEK2, NBN, NF1, PALB2, PTEN, STK11, TP53.  Normal results.     We reviewed the features of sporadic, familial, and hereditary cancers.  The vast majority of cancers are considered sporadic and not primarily due to an inherited factor.  Individuals can develop cancer due to aging, chance events, environmental exposures or lifestyles.  Mainly inherited factors (altered cancer susceptibility genes) are responsible for only a small number of cancer cases, approximately 5-10%.     A detailed handout regarding the Breast Actionable panel can be found in the after visit summary. Topics included: inheritance pattern, cancer risks, cancer screening recommendations, and also risks, benefits and limitations of testing.    We discussed that there are additional genes that could cause increased risk for breast and other cancer. Of note, the testing  completed included the majority of high-moderate risk breast cancer susceptibility genes which have published medical management guidelines.  We reviewed the availability of expanded testing; many of these genes present with overlapping features in a family and accurate cancer risk cannot always be established based upon the pedigree analysis alone.  Expanded panel testing was declined at this time.       We reviewed screening recommendations for [place] family.      Deirdre s close female relatives may remain at increased risk for breast cancer given their family history. Breast cancer screening is generally recommended to begin approximately 10 years younger than the earliest age of breast cancer diagnosis in the family, or at age 40, whichever comes first. In this family, screening may begin at age 40. Breast screening options should be discussed with an individual's primary care provider and a genetic counselor, to determine at what age to begin screening, what screening is appropriate, and if additional screening (such as breast MRI) is necessary based on  personal/family history factors.      Other population cancer screening options, such as those recommended by the American Cancer Society and the National Comprehensive Cancer Network (NCCN), are also appropriate for Deirdre and her family. These screening recommendations may change if there are changes to Deirdre's personal and/or family history of cancer. Final screening recommendations should be made by each individual's managing physician.  These recommendations may change should there be changes in her personal/family history, and Deirdre is encouraged to follow up should she have questions in the future.      Plan:  1) Results from the Breast Actionable panel were reviewed today.  No further testing was ordered.  2) We reviewed screening recommendations for Deirdre and her family.  She is encouraged to keep in touch if there are changes in her personal/family history, or should she be interested in revisiting testing options in the future.      Face to face time (video visit): 20 minutes    Karma De Anda MS, [facility]  Licensed, Certified Genetic Counselor  [facility]  Phone: [phone]

## 2021-02-05 NOTE — LETTER
2/5/2021         RE: Deirdre Jesus  [address] [place] [address]        Dear Colleague,    Thank you for referring your patient, Deirdre Jesus, to the [facility]. Please see a copy of my visit note below.    2/5/2021    Referring Provider: Sung Chiang MD    Presenting Information:   I met with Deirdre Jesus and her  today for genetic counseling over video visit as part of the Cancer Risk Management Program.  She is here today to review the results from her previous genetic testing, recent diagnosis of DCIS, and family history of cancer.     Personal History:  Deirdre is a 51 year old female. She was diagnosed with left breast DCIS at age 50 treated with lumpectomy.  Breast Actionable genetic testing was ordered for treatment decision making which was normal/negative.  These results were reviewed today, and a copy of the report is available through [employer].     Breast Actionable Panel: NEGATIVE. Next generation sequencing and copy number variation analysis of: EASTON, BRCA1, BRCA2, CDH1, CHEK2, NBN, NF1, PALB2, PTEN, STK11, TP53.  No pathogenic mutations or variants of uncertain significance were detected in the 11 genes analyzed.     Deirdre has her ovaries, fallopian tubes and uterus in place.  Her most recent colonoscopy from 2018 was normal, and follow up was recommended in 10 years.  She has been followed with transvaginal ultrasounds (most recent 9/22/2020) for a left ovarian lesion.      Family History: (Please see scanned pedigree for detailed family history information)    Her brother was diagnosed with an early stage prostate cancer at age 60.      She reports that her paternal grandmother passed away in her 60's due to cancer (primary cancer diagnosis not known)    Her maternal ethnicity is Syrian. Her paternal ethnicity is Syrian.  There is no known Ashkenazi Sikh ancestry on either side of her family.     Discussion:    Deirdre has a recent diagnosis  of left breast DCIS treated with lumpectomy.  Breast Actionable genetic testing was ordered by her provider for treatment decision making.      Breast Actionable Panel. Next generation sequencing and copy number variation analysis of: EASTON, BRCA1, BRCA2, CDH1, CHEK2, NBN, NF1, PALB2, PTEN, STK11, TP53.  Normal results.     We reviewed the features of sporadic, familial, and hereditary cancers.  The vast majority of cancers are considered sporadic and not primarily due to an inherited factor.  Individuals can develop cancer due to aging, chance events, environmental exposures or lifestyles.  Mainly inherited factors (altered cancer susceptibility genes) are responsible for only a small number of cancer cases, approximately 5-10%.     A detailed handout regarding the Breast Actionable panel can be found in the after visit summary. Topics included: inheritance pattern, cancer risks, cancer screening recommendations, and also risks, benefits and limitations of testing.    We discussed that there are additional genes that could cause increased risk for breast and other cancer. Of note, the testing  completed included the majority of high-moderate risk breast cancer susceptibility genes which have published medical management guidelines.  We reviewed the availability of expanded testing; many of these genes present with overlapping features in a family and accurate cancer risk cannot always be established based upon the pedigree analysis alone.  Expanded panel testing was declined at this time.       We reviewed screening recommendations for Deirdre's family.      Deirdre s close female relatives may remain at increased risk for breast cancer given their family history. Breast cancer screening is generally recommended to begin approximately 10 years younger than the earliest age of breast cancer diagnosis in the family, or at age 40, whichever comes first. In this family, screening may begin at age 40. Breast screening  options should be discussed with an individual's primary care provider and a genetic counselor, to determine at what age to begin screening, what screening is appropriate, and if additional screening (such as breast MRI) is necessary based on personal/family history factors.      Other population cancer screening options, such as those recommended by the American Cancer Society and the National Comprehensive Cancer Network (NCCN), are also appropriate for Deirdre and her family. These screening recommendations may change if there are changes to Deirdre's personal and/or family history of cancer. Final screening recommendations should be made by each individual's managing physician.  These recommendations may change should there be changes in her personal/family history, and Deirdre is encouraged to follow up should she have questions in the future.      Plan:  1) Results from the Breast Actionable panel were reviewed today.  No further testing was ordered.  2) We reviewed screening recommendations for Deirdre and her family.  She is encouraged to keep in touch if there are changes in her personal/family history, or should she be interested in revisiting testing options in the future.      Face to face time (video visit): 20 minutes    Karma De Anda MS, [facility] [place]  Licensed, Certified Genetic Counselor  [facility]  Phone: [phone]

## 2021-02-08 LAB — COPATH REPORT: NORMAL

## 2021-02-15 NOTE — PATIENT INSTRUCTIONS
BREAST ACTIONABLE PANEL    The Breast Actionable cancer panel is a genetic test which analyzes 11 genes known to be associated with an increased lifetime risk of breast and other cancers. Published medical guidelines exist for detection, prevention, risk reduction, and/or treatment for individuals with mutations in these genes.     Of note, the Breast Actionable cancer panel is not considered to be comprehensive. Individuals with a family history of other cancers should be seen by a genetic counselor to discuss the family history and the possibility of expanded genetic testing.     GENES (11) ASSOCIATED CANCER(S) ASSOCIATED CANCER SYNDROME(S)   EASTON breast, pancreas    BRCA1 breast, ovary, pancreas, melanoma, prostate, male breast Hereditary breast /ovarian cancer syndrome (HBOC)   BRCA2 breast, ovary, pancreas, melanoma, prostate, male breast Hereditary breast /ovarian cancer syndrome (HBOC)   CDH1 breast, stomach, colon Hereditary diffuse gastric cancer   CHEK2 breast, colon, prostate    NBN breast, ovary, prostate    NF1 breast, brain, peripheral nervous system Neurofibromatosis 1   PALB2 breast, pancreas    PTEN breast, thyroid, uterus, colon, kidney Cowden syndrome, Wfgkyzes-Kixey-Lkrifqrdm syndrome (BRRS), PTEN-related Proteus syndrome (PS), Proteus-like syndrome   STK11 breast, ovary, colon, pancreas, stomach, uterus, cervix Peutz-Jeghers syndrome   TP53 breast, bone, brain, soft tissues, adrenal cortex Li-Fraumeni syndrome     Meeting with a Genetic Counselor  After you receive the results of the Breast Actionable Panel testing, providers will often refer you to see a genetic counselor. This is because patients can have a family history of cancer other than breast cancer and may benefit from a discussion about comprehensive, expanded genetic testing.     Additionally, you and the genetic counselor will discuss the impact of genetic testing on you and your family members, go over your family health  history, and talk about potential screening and interventions.     Assessing Cancer Risk  Only about 5-10% of cancers are thought to be due to an inherited cancer susceptibility gene.  These families often have:    Several people with the same or related types of cancer    Cancers diagnosed at a young age (before age 50)    Individuals with more than one primary cancer    Multiple generations of the family affected with cancer    Some people may be candidates for genetic testing of more than one gene.  For these families, genetic testing using a multi-gene cancer panel may be offered.  These panels may test genes known to increase the risk for breast (and other) cancers: EASTON, BRCA1, BRCA2, CDH1, CHEK2, PALB2, PTEN, and TP53.  The purpose of this handout is to serve as a brief summary of the high/moderate-risk breast cancer genes which have published clinical management guidelines for individuals who are found to carry a mutation.    BRCA1 and BRCA2-Hereditary Breast and Ovarian Cancer Syndrome (HBOC)  A single mutation in one of the copies of BRCA1 or BRCA2 increases the risk for breast and ovarian cancer, among others.  The risk for pancreatic cancer and melanoma may also be slightly increased in some families.  The tables below list the chance that someone with a BRCA mutation would develop cancer in his or her lifetime1,2,3,4.          Women   Men     General Population BRCA+    General Population BRCA+   Breast  12% 40-80%  Breast <1% ~7%   Ovarian  1-2% 10-40%  Prostate 16% 20%       A person s ethnic background is also important to consider, as individuals of Ashkenazi Mosque ancestry have a higher chance of having a BRCA gene mutation.  There are three BRCA mutations that occur more frequently in this population.     Individuals who inherit two BRCA2 mutations--one from their mother and one from their father--have a condition called Fanconi Anemia.  This rare autosomal recessive condition is associated with  short stature, developmental delay, bone marrow failure, and increased risk for childhood cancers.    TP53-Li-Fraumeni Syndrome (LFS)  LFS is a cancer predisposition syndrome. Individuals with LFS are at an increased risk for developing cancer at a young age. The general lifetime risk for development of cancer is 50% by age 30 and 90% by age 60.  LFS is caused by a mutation in the TP53 gene.  A single mutation in one of the copies of TP53 increases the risk for multiple cancers.     Core Cancers: Sarcomas, Breast, Brain, Lung, Leukemias/Lymphomas, Adrenocortical carcinomas  Other Cancers: Gastrointestinal, Thyroid, Skin, Genitourinary    PTEN-Cowden Syndrome  Cowden syndrome is a hereditary condition that increases the risk for breast, thyroid, endometrial, and kidney cancer.  Cowden syndrome is caused by a mutation in the PTEN gene.  A single mutation in one of the copies of PTEN causes Cowden syndrome and increases cancer risk.  The table below shows the chance that someone with a PTEN mutation would develop cancer in their lifetime5,6.  Other benign features seen in some individuals with Cowden syndrome include benign skin lesions (facial papules, keratoses, lipomas), learning disability, autism, thyroid nodules, colon polyps, and larger head size.      Lifetime Cancer Risk   Cancer Type General Population Cowden Syndrome   Breast  12% 25-50%*   Thyroid  1% 35%   Renal 1-2% 35%   Endometrial  2-3% 28%   Colon 5% 9%   Melanoma 2-3% >5%     *One recent study found breast cancer risk to be increased to 85%    CDH1-Hereditary Diffuse Gastric Cancer (HDGC)  Currently, one gene is known to cause hereditary diffuse gastric cancer: CDH1.  Individuals with HDGC are at increased risk for diffuse gastric cancer and lobular breast cancer. Of people diagnosed with HDGC, 30-50% have a mutation in the CDH1 gene.  This suggests there are likely other genes that may cause HDGC that have not been identified yet.      Lifetime  Cancer Risks    General Pop [facility]    Diffuse Gastric  <1% ~80%   Breast 12% 39-52%       Additional Genes Associated with Increased Breast Cancer Risk  PALB2  Mutations in PALB2 have been shown to increase the risk of breast cancer up to 33-58% in some families; where individuals fall within this risk range is dependent upon family history.9 PALB2 mutations have also been associated with increased risk for pancreatic cancer, although this risk has not been quantified yet.  Individuals who inherit two PALB2 mutations--one from their mother and one from their father--have a condition called Fanconi Anemia.  This rare autosomal recessive condition is associated with short stature, developmental delay, bone marrow failure, and increased risk for childhood cancers.    EASTON  EASTON is a moderate-risk breast cancer gene. Women who have a mutation in EASTON can have between a 2-4 fold increased risk for breast cancer compared to the general population.10 EASTON mutations have also been associated with increased risk for pancreatic cancer, however an estimate of this cancer risk is not well understood.11  Individuals who inherit two EASTON mutations have a condition called ataxia-telangiectasia (AT).  This rare autosomal recessive condition affects the nervous system and immune system, and is associated with progressive cerebellar ataxia beginning in childhood.  Individuals with ataxia-telangiectasia often have a weakened immune system and have an increased risk for childhood cancers.         CHEK2   CHEK2 is a moderate-risk breast cancer gene.  Women who have a mutation in CHEK2 have around a 2-fold increased risk for breast cancer compared to the general population, and this risk may be higher depending upon family history.12,13,14 Mutations in CHEK2 have also been shown to increase the risk of a number of other cancers, including colon and prostate, however these cancer risks are currently not well understood.      Inheritance   All of  the genes reviewed above are inherited in an autosomal dominant pattern. This means that if a parent has a mutation, each of his or her children will have a 50% chance of inheriting that same mutation.  Therefore, each child--male or female--would have a 50% chance of being at increased risk for developing cancer.        Image obtained from Genetics Home Reference, 2013     In rare cases, there can be mutations in both copies of these genes (one from each parent), leading to different autosomal recessive conditions.  Mutations in some genes can occur de nadine, which means that a person s mutation occurred for the first time in them and was not inherited from a parent.  Now that they have the mutation, however, it can be passed on to future generations.     Genetic Testing  Genetic testing involves a blood test and will look for any harmful mutations within genes that are associated with increased cancer risk.  If possible, it is recommended that the person(s) who has had cancer be tested before other family members.  That person will give us the most useful information about whether or not a specific gene is associated with the cancer in the family.    Results  There are three possible results of genetic testing:    Positive--a harmful mutation was identified in one or more of the genes    Negative--no mutation was identified in any of the genes on this panel    Variant of unknown significance--a variation in one of the genes was identified, but it is unclear how this impacts cancer risk in the family    Advantages and Disadvantages  There are advantages and disadvantages to genetic testing.  Advantages    May clarify your cancer risk    Can help you make medical decisions    May explain the cancers in your family    May give useful information to your family members (if you share your results)    Disadvantages    Possible negative emotional impact of learning about inherited cancer risk    Uncertainty in  interpreting a negative test result in some situations    Possible genetic discrimination concerns (see below)    Genetic Information Nondiscrimination Act (TOSHIA)  TOSHIA is a federal law that protects individuals from health insurance or employment discrimination based on a genetic test result alone.  Although rare, there are currently no legal discrimination protections in terms of life insurance, long term care, or disability insurances. Visit the National Human Genome Research [place] genome.gov/34338984 to learn more.    Reducing Cancer Risk  Each of the genes listed within this handout have nationally recognized cancer screening guidelines that would be recommended for individuals who test positive.  In addition to increased cancer screening, surgeries may be offered or recommended to reduce cancer risk.  Recommendations are based upon an individual s genetic test result as well as their personal and family history of cancer.    Questions to Think About Regarding Genetic Testing    What effect will the test result have on me and my relationship with my family members if I have an inherited gene mutation?  If I don t have a gene mutation?    Should I share my test results, and how will my family react to this news, which may also affect them?    Are my children ready to learn new information that may one day affect their own health?    Resources  FORCE: Facing Our Risk of Cancer Empowered facingourrisk.org   Bright Pink bebrightpink.org   Li-Fraumeni Syndrome Association lfsassociation.org   PTEN World PTENworld.[employer]   No stomach for cancer, Inc. nostomachforcancer.org   Stomach cancer relief network scrnet.org   Collaborative Group of the Americas on Inherited Colorectal Cancer (CGA) cgaicc.com    Cancer Care cancercare.org   American Cancer Society (ACS) cancer.org   National Cancer [place] (NCI) cancer.gov     Cancer Risk Management Program [phone]-[facility]-CANCER ([phone])  ? Oral Hobbs MS,  [facility]  [phone]  ? Jennyfer Moreland, MS, [facility]  [phone]  ? Karma De Anda, MS, [facility]  [phone]  ? Rodolfo Pink, MS, [facility]  [phone]  ? Li Burgess, MS, [facility]  [phone]  ? Yani Araiza, MS, [facility]  [phone]  ? Hyacinth Aleman, MS, [facility]  [phone]    References  1. Ely AMEZQUITA, Yvan PDP, Narod S, Risch GANN, Bakari JE, Zac JL, Dinesh N, Trae H, Prudence O, Nicole A, Carlos B, Niki P, Roxanne S, Loly DM, Benedicto N, Kale E, Vinod H, Bijan E, Delma J, Oj J, George B, Tulinius H, Thorlacius S, Eerola H, Bossman H, Selin K, Ricky OP. Average risks of breast and ovarian cancer associated with BRCA1 or BRCA2 mutations detected in case series unselected for family history: a combined analysis of 222 studies. Am J Hum Deb. 2003;72:1117-30.  2. Frandy N, Mila M, Rip G.  BRCA1 and BRCA2 Hereditary Breast and Ovarian Cancer. Gene Reviews online. 2013.  3. Mansoor YC, Idalmis S, Fatimah G, Grant S. Breast cancer risk among male BRCA1 and BRCA2 mutation carriers. J Natl Cancer Inst. 2007;99:1811-4.  4. Ever BEARD, Annetta I, Randall J, Stefan E, Navarro ER, Floyd F. Risk of breast cancer in male BRCA2 carriers. J Med Deb. 2010;47:710-1.  5. Peña MH, Mary J, Ramiro J, Yong JANSEN, Morgan MS, Eng C. Lifetime cancer risks in individuals with germline PTEN mutations. Clin Cancer Res. 2012;18:400-7.  6. Dejuanki R. Cowden Syndrome: A Critical Review of the Clinical Literature. J Deb . 2009:18:13-27.  7. National Comprehensive Cancer Network. Clinical practice guidelines in oncology, colorectal cancer screening. Available online (registration required). 2013.  8. National Cancer [place]. SEER Cancer Stat Fact Sheets.  December 2013.  9. Ely COLEY et al. Breast-Cancer Risk in Families with Mutations in PALB2. NEJM. 2014; 371(6):497-506.  10. Quirino AMEZQUITA, Davey D, Key S, Sybil P, Jose T, Bri M, Timo B, Сергей H, Mague R, Deejay K, Rocio GUZMAN, Ever BEARD, Loly  D, Gen DF, Raul MR, The Breast Cancer Susceptibility Collaboration (UK) & Beth FELIX. EASTON mutations that cause ataxia-telangiectasia are breast cancer susceptibility alleles. Nature Genetics. 2006;38:873-875  11. Guille N , Mary Y, Jennifer J, Elsa L, Lorie GM , Barby ML, Mohsen S, Lemos AG, Syngal S, Paul ML, Paulina J , Marsha R, Krystina SZ, Cassidy JR, Jared VE, Sarah M, Vobritany B, Dylan N, Salinas RH, Maia KW, and Sofia AP. EASTON mutations in patients with hereditary pancreatic cancer. Cancer Discover. 2012;2:41-46  12. CHEK2 Breast Cancer Case-Control Consortium. CHEK2*1100delC and susceptibility to breast cancer: A collaborative analysis involving 10,860 breast cancer cases and 9,065 controls from 10 studies. Am J Hum Deb, 74 (2004), pp. 2168-1348  13. Kristina T, Avery S, Kat K, et al. Spectrum of Mutations in BRCA1, BRCA2, CHEK2, and TP53 in Families at High Risk of Breast Cancer. REYES, 2006;295(12):6402-7373.   14. Bill C, William D, Spring A, et al. Risk of breast cancer in women with a CHEK2 mutation with and without a family history of breast cancer. JClin Oncol. 2011;29:0923-7630.      TURNAROUND TIME  4 - 6 weeks    INSURANCE COVERAGE   A prior authorization will be submitted when your provider submits the order for this panel. Testing will be held until prior authorization is obtained. You will be contacted once prior authorization is completed. For details regarding coverage and out-of-pocket estimates, please contact a  at the [facility] at [phone].    About the [facility]), [facility]  In collaborative effort with the [facility] and the [place] [facility], the [facility] offers a full range of diagnostic testing services, with a strong focus on quality, accuracy, and timeliness.

## 2021-02-19 ENCOUNTER — VIRTUAL VISIT (OUTPATIENT)
Dept: ONCOLOGY | Facility: CLINIC | Age: 51
End: 2021-02-19
Attending: SURGERY
Payer: COMMERCIAL

## 2021-02-19 DIAGNOSIS — D05.12 DUCTAL CARCINOMA IN SITU (DCIS) OF LEFT BREAST: Primary | ICD-10-CM

## 2021-02-19 NOTE — PROGRESS NOTES
TELEPHONE VISIT      HISTORY OF PRESENT ILLNESS:  Today, I had a phone visit with Deirdre Jesus to talk about her results after undergoing a lumpectomy for DCIS.  Her final pathology report revealed an 8 mm area of DCIS with widely negative margins.  She had a grade 2 DCIS on the lumpectomy specimen and grade 3 DCIS on the needle biopsy.      ASSESSMENT/PLAN:  Today we talked about her risk of recurrence with and without radiation therapy.  I do want her to see a radiation oncologist, Dr. Allen, and we will get that set up.  If she does not have radiation, then we could also consider giving her endocrine therapy, depending on what Dr. Allen thinks regarding radiation.

## 2021-02-19 NOTE — PROGRESS NOTES
Deirdre is a 51 year old who is being evaluated via a billable telephone visit.      What phone number would you like to be contacted at? [phone]  How would you like to obtain your AVS? GAB Sibley    Phone call duration: 12 minutes

## 2021-02-19 NOTE — LETTER
2/19/2021         RE: Deirdre Jesus  [address] [place] [address]        Dear Colleague,    Thank you for referring your patient, Deirdre Jesus, to the [facility]. Please see a copy of my visit note below.    Deirdre is a 51 year old who is being evaluated via a billable telephone visit.      What phone number would you like to be contacted at? [phone]  How would you like to obtain your AVS? GAB Sibley    Phone call duration: 12 minutes    TELEPHONE VISIT      HISTORY OF PRESENT ILLNESS:  Today, I had a phone visit with Deirdre Jesus to talk about her results after undergoing a lumpectomy for DCIS.  Her final pathology report revealed an 8 mm area of DCIS with widely negative margins.  She had a grade 2 DCIS on the lumpectomy specimen and grade 3 DCIS on the needle biopsy.      ASSESSMENT/PLAN:  Today we talked about her risk of recurrence with and without radiation therapy.  I do want her to see a radiation oncologist, Dr. Allen, and we will get that set up.  If she does not have radiation, then we could also consider giving her endocrine therapy, depending on what Dr. Allen thinks regarding radiation.     Again, thank you for allowing me to participate in the care of your patient.        Sincerely,        Sung Chiang MD

## 2021-02-22 ENCOUNTER — ANCILLARY PROCEDURE (OUTPATIENT)
Dept: ULTRASOUND IMAGING | Facility: CLINIC | Age: 51
End: 2021-02-22
Attending: OBSTETRICS & GYNECOLOGY
Payer: COMMERCIAL

## 2021-02-22 DIAGNOSIS — R19.00 PELVIC MASS: Primary | ICD-10-CM

## 2021-02-22 DIAGNOSIS — R19.00 PELVIC MASS: ICD-10-CM

## 2021-02-22 PROCEDURE — 76856 US EXAM PELVIC COMPLETE: CPT | Mod: GC | Performed by: RADIOLOGY

## 2021-02-22 PROCEDURE — 93976 VASCULAR STUDY: CPT | Mod: 59 | Performed by: RADIOLOGY

## 2021-02-22 PROCEDURE — 76830 TRANSVAGINAL US NON-OB: CPT | Mod: GC | Performed by: RADIOLOGY

## 2021-02-23 NOTE — PROGRESS NOTES
Department of Radiation Oncology                   [place] [address]  [address] [place]  [address]  Office:  [phone]  Fax:  [phone]   Radiation Oncology Clinic  [address] [place] [address]  Phone:  [phone]  Fax:  [phone]     RE: Deirdre Jesus : 1970   MRN: [id number] LILLIAN: 2021       OUTPATIENT VISIT NOTE       PROBLEM: Left breast DCIS    HISTORY OF PRESENT ILLNESS: Ms. 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     She underwent screening mammogram on 2020 which revealed possible developing calcifications in the posterior left inner breast at 7:00.  Diagnostic mammogram on 2020 confirmed a group of calcifications at this position, and stereotactic biopsy was performed on 2021.  Pathology () revealed grade 3 DCIS, % strong, AZ 2% moderate.  Next generation sequencing was performed on 2021 with no detected abnormalities.  She was seen by Dr. Chiang in surgery over video on 2021 who discussed surgical options.  On 2021 she underwent uncomplicated wire localized lumpectomy.  Intraoperatively, Dr. Dewitt excised additional medial, anterior, inferior, and lateral margins due to close proximity of the clip to the medial aspect of the incision. Pathology ([id number]) revealed 8 mm of grade 2 DCIS withclosest margins greater than 5 mm.     Ms. Jesus was doing well at postoperative telephone visit and she was referred to radiation oncology for consideration of adjuvant therapy. Of note, she also follows with Dr. Chirinos in GYN onc for a left complex ovarian cyst diagnosed after presenting with pelvic pain and postmenopausal bleeding.  Most recent ultrasound pelvis was performed on 2021 without significant change.    On interview today, she  states she is well healed from the surgery. She denies range of motion issues.  She is overall in good health and exercises regularly.  She was previously told by Dr. Chiang that she has a very low risk disease that adjuvant treatment may not be warranted.     PMH:   Past Medical History:   Diagnosis Date     Breast cancer (H) 2020   Post menopausal bleeding  Left ovarian cyst    PSH:  Past Surgical History:   Procedure Laterality Date     BIOPSY  01    breast biopsy     BUNIONECTOMY Bilateral      COLONOSCOPY  2018     COLONOSCOPY WITH CO2 INSUFFLATION N/A 10/19/2018    Procedure: colonscopy;  Surgeon: Duane, William Charles, MD;  Location: [facility] OR     LUMPECTOMY BREAST Left 2021    Procedure: Left wire localized lumpectomy;  Surgeon: Sung Chiang MD;  Location:  OR     ORTHOPEDIC SURGERY      bunion surgery       MEDICATIONS:  Current Outpatient Medications   Medication Sig Dispense Refill     Cholecalciferol (VITAMIN D PO)        multivitamin, therapeutic with minerals (MULTI-VITAMIN) TABS tablet Take 1 tablet by mouth daily         ALLERGY:  No Known Allergies    FAMILY HISTORY:  Prostate cancer in brother    SOCIAL HISTORY:  Never smoker.  Lives with  in [place], [place].   Former nurse.   Enjoys cooking, reading, gardening.    GYN HISTORY:        ECOG PERFORMANCE STATUS: 0    HISTORY OF RADIATION: None  IMPLANTED CARDIAC DEVICE: None  PREGNANCY RISK: Post-menopausal    REVIEW OF SYMPTOMS:  A full 10-point review of systems was performed as per nursing note.  Pertinent negatives and positives reviewed.    PHYSICAL EXAMINATION:    Patient seen over video, speaking comfortably in NAD  Part of the consult was done via telephone as the video froze up.    ASSESSMENT AND PLAN: In summary, Ms. Jesus is a 52 yo female with a DCIS of the left breast. This was screening detected.  Initial biopsy revealed Grade 3 disease with presence of comedonecrosis.  Final pathology from lumpectomy was more  consistent with grade 2 DCIS, measuring 8 mm in greatest dimension, %. Margins were widely negatively by about at least 5 mm.    Today, I discussed the rationale of adjuvant therapy in the management of DCIS.  I acknowledged that DCIS is a precancerous lesion, but recurrence can be invasive carcinoma in 50% of the cases.  The goal of adjuvant therapy is to reduce the risk of invasive recurrence.     We discussed the risk factors predicting a local recurrence, including age, grade, tumor size and margin.  She does have several favorable factors, however, we also pointed out that her young age of 51 and finding of comedonecrosis may have some influence on her recurrence risk.      I described the ECOG 5194 trial, and quoted an approximately 1.5% per year of local recurrence rate without radiation therapy.  Radiation is expected to reduce the ipsilateral breast recurrence by about 50%.  We also discussed the role of endocrine therapy in reducing both ipsilateral and contralateral recurrence.  She indicated that she is not interested in endocrine therapy.    We also ran the [facility] nomogram together.  Her 10 year risk of locoregional recurrence is predicted to be 16%. With radiation, the risk will be reduced to about 6%.  I cautioned her the caveat of using a nomogram for risk prediction.    In the end, Deirdre would like to think this over. She is leaning towards not having radiation, which I don't think is entirely unreasonable.  I told her that given her young age, most radiation oncologists likely would lean towards recommending radiation therapy to maximize her local control.     She will let us know of her decision via Pikhub.       Thank you for allowing us to participate in this patient's care.  Please feel free to call with any questions or concerns.               I reviewed patient's chart, internal/external medical records, imaging studies (including actual images), labs and pathology reports.  I  interviewed and counseled the patient via telemedicine.      90 minutes were spent on the date of the encounter doing chart review, interview/counseling, documentation and further activities as noted above.     Yesenia Allen MD

## 2021-02-24 ENCOUNTER — VIRTUAL VISIT (OUTPATIENT)
Dept: RADIATION ONCOLOGY | Facility: CLINIC | Age: 51
End: 2021-02-24
Attending: SURGERY
Payer: COMMERCIAL

## 2021-02-24 DIAGNOSIS — D05.12 DUCTAL CARCINOMA IN SITU (DCIS) OF LEFT BREAST: Primary | ICD-10-CM

## 2021-02-24 PROCEDURE — G0463 HOSPITAL OUTPT CLINIC VISIT: HCPCS | Mod: 95 | Performed by: RADIOLOGY

## 2021-02-24 SDOH — ECONOMIC STABILITY: FOOD INSECURITY: WITHIN THE PAST 12 MONTHS, YOU WORRIED THAT YOUR FOOD WOULD RUN OUT BEFORE YOU GOT MONEY TO BUY MORE.: NOT ASKED

## 2021-02-24 SDOH — ECONOMIC STABILITY: INCOME INSECURITY: HOW HARD IS IT FOR YOU TO PAY FOR THE VERY BASICS LIKE FOOD, HOUSING, MEDICAL CARE, AND HEATING?: NOT ASKED

## 2021-02-24 SDOH — ECONOMIC STABILITY: TRANSPORTATION INSECURITY
IN THE PAST 12 MONTHS, HAS LACK OF TRANSPORTATION KEPT YOU FROM MEETINGS, WORK, OR FROM GETTING THINGS NEEDED FOR DAILY LIVING?: NOT ASKED

## 2021-02-24 SDOH — ECONOMIC STABILITY: TRANSPORTATION INSECURITY
IN THE PAST 12 MONTHS, HAS THE LACK OF TRANSPORTATION KEPT YOU FROM MEDICAL APPOINTMENTS OR FROM GETTING MEDICATIONS?: NOT ASKED

## 2021-02-24 SDOH — ECONOMIC STABILITY: FOOD INSECURITY: WITHIN THE PAST 12 MONTHS, THE FOOD YOU BOUGHT JUST DIDN'T LAST AND YOU DIDN'T HAVE MONEY TO GET MORE.: NOT ASKED

## 2021-02-24 ASSESSMENT — ENCOUNTER SYMPTOMS
COUGH: 0
HEADACHES: 0
NAUSEA: 0
DOUBLE VISION: 0
DEPRESSION: 0
DIARRHEA: 0
WEIGHT LOSS: 0
BACK PAIN: 0
BRUISES/BLEEDS EASILY: 0
DYSURIA: 0
EYE PAIN: 0
DIAPHORESIS: 0
VOMITING: 0
BLOOD IN STOOL: 0
TINGLING: 0
FALLS: 0
SORE THROAT: 0
FREQUENCY: 0
SEIZURES: 0
HEMATURIA: 0
NECK PAIN: 0
CHILLS: 0
CONSTIPATION: 0
DIZZINESS: 0
INSOMNIA: 0
SHORTNESS OF BREATH: 0
NERVOUS/ANXIOUS: 0
FEVER: 0
BLURRED VISION: 0

## 2021-02-24 NOTE — LETTER
2021         RE: Deirdre Jesus  [address] [place] [address]        Dear Colleague,    Thank you for referring your patient, Deirdre Jesus, to the [facility] RADIATION ONCOLOGY. Please see a copy of my visit note below.    Department of Radiation Oncology                   [place] [address]  [address] [place]  [address]  Office:  [phone]  Fax:  [phone]   Radiation Oncology Clinic  [address] [place] [address]  Phone:  [phone]  Fax:  [phone]     RE: Deirdre Jseus : 1970   MRN: [id number] LILLIAN: 2021       OUTPATIENT VISIT NOTE       PROBLEM: Left breast DCIS    HISTORY OF PRESENT ILLNESS: Ms. 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     She underwent screening mammogram on 2020 which revealed possible developing calcifications in the posterior left inner breast at 7:00.  Diagnostic mammogram on 2020 confirmed a group of calcifications at this position, and stereotactic biopsy was performed on 2021.  Pathology ([id number]) revealed grade 3 DCIS, % strong, [place] 2% moderate.  Next generation sequencing was performed on 2021 with no detected abnormalities.  She was seen by Dr. Chiang in surgery over video on 2021 who discussed surgical options.  On 2021 she underwent uncomplicated wire localized lumpectomy.  Intraoperatively, Dr. Dewitt excised additional medial, anterior, inferior, and lateral margins due to close proximity of the clip to the medial aspect of the incision. Pathology ([id number]) revealed 8 mm of grade 2 DCIS withclosest margins greater than 5 mm.     Ms. Jesus was doing well at postoperative telephone visit and she was referred to radiation oncology for consideration of adjuvant therapy. Of note, she  also follows with Dr. Chirinos in GYN onc for a left complex ovarian cyst diagnosed after presenting with pelvic pain and postmenopausal bleeding.  Most recent ultrasound pelvis was performed on 2021 without significant change.    On interview today, she states she is well healed from the surgery. She denies range of motion issues.  She is overall in good health and exercises regularly.  She was previously told by Dr. Chiang that she has a very low risk disease that adjuvant treatment may not be warranted.     PMH:   Past Medical History:   Diagnosis Date     Breast cancer (H) 2020   Post menopausal bleeding  Left ovarian cyst    PSH:  Past Surgical History:   Procedure Laterality Date     BIOPSY  01    breast biopsy     BUNIONECTOMY Bilateral      COLONOSCOPY  2018     COLONOSCOPY WITH CO2 INSUFFLATION N/A 10/19/2018    Procedure: colonscopy;  Surgeon: Duane, William Charles, MD;  Location: [facility] OR     LUMPECTOMY BREAST Left 2021    Procedure: Left wire localized lumpectomy;  Surgeon: Sung Chiang MD;  Location: [facility] OR     ORTHOPEDIC SURGERY      bunion surgery       MEDICATIONS:  Current Outpatient Medications   Medication Sig Dispense Refill     Cholecalciferol (VITAMIN D PO)        multivitamin, therapeutic with minerals (MULTI-VITAMIN) TABS tablet Take 1 tablet by mouth daily         ALLERGY:  No Known Allergies    FAMILY HISTORY:  Prostate cancer in brother    SOCIAL HISTORY:  Never smoker.  Lives with  in [place], [place].   Former nurse.   Enjoys cooking, reading, gardening.    GYN HISTORY:        ECOG PERFORMANCE STATUS: 0    HISTORY OF RADIATION: None  IMPLANTED CARDIAC DEVICE: None  PREGNANCY RISK: Post-menopausal    REVIEW OF SYMPTOMS:  A full 10-point review of systems was performed as per nursing note.  Pertinent negatives and positives reviewed.    PHYSICAL EXAMINATION:    Patient seen over video, speaking comfortably in NAD  Part of the consult was done via telephone as the  video froze up.    ASSESSMENT AND PLAN: In summary, Ms. Jesus is a 52 yo female with a DCIS of the left breast. This was screening detected.  Initial biopsy revealed Grade 3 disease with presence of comedonecrosis.  Final pathology from lumpectomy was more consistent with grade 2 DCIS, measuring 8 mm in greatest dimension, %. Margins were widely negatively by about at least 5 mm.    Today, I discussed the rationale of adjuvant therapy in the management of DCIS.  I acknowledged that DCIS is a precancerous lesion, but recurrence can be invasive carcinoma in 50% of the cases.  The goal of adjuvant therapy is to reduce the risk of invasive recurrence.     We discussed the risk factors predicting a local recurrence, including age, grade, tumor size and margin.  She does have several favorable factors, however, we also pointed out that her young age of 51 and finding of comedonecrosis may have some influence on her recurrence risk.      I described the ECOG 5194 trial, and quoted an approximately 1.5% per year of local recurrence rate without radiation therapy.  Radiation is expected to reduce the ipsilateral breast recurrence by about 50%.  We also discussed the role of endocrine therapy in reducing both ipsilateral and contralateral recurrence.  She indicated that she is not interested in endocrine therapy.    We also ran the [facility] nomogram together.  Her 10 year risk of locoregional recurrence is predicted to be 16%. With radiation, the risk will be reduced to about 6%.  I cautioned her the caveat of using a nomogram for risk prediction.    In the end, Deirdre would like to think this over. She is leaning towards not having radiation, which I don't think is entirely unreasonable.  I told her that given her young age, most radiation oncologists likely would lean towards recommending radiation therapy to maximize her local control.     She will let us know of her decision via [employer].       Thank you for allowing us  to participate in this patient's care.  Please feel free to call with any questions or concerns.               I reviewed patient's chart, internal/external medical records, imaging studies (including actual images), labs and pathology reports.  I interviewed and counseled the patient via telemedicine.      90 minutes were spent on the date of the encounter doing chart review, interview/counseling, documentation and further activities as noted above.     Yesenia Allen MD      Deirdre is a 51 year old who is being evaluated via a billable telephone visit.      What phone number would you like to be contacted at? [phone]  How would you like to obtain your AVS? MyChart    [facility]    INITIAL PATIENT ASSESSMENT    Diagnosis: Breast cancer, lumpectomy 2/1/2021    Prior radiation therapy: None    Prior chemotherapy: None    Prior hormonal therapy:No    Pain Eval:  Denies. Breast soreness, OTC medication at at bedtime and PRN.    Psychosocial  Living arrangements: With family  Fall Risk: independent    1. Falls Risk Screening Questions - Radiation consult  2. Have you fallen in the past one week?  No.  3. Have you felt unsteady when walking or standing in the past one week?  No.     referral needs: Not needed    Advanced Directive: No  Implantable Cardiac Device? No    Onset of menarche: @ 11  LMP: No LMP recorded. Patient is postmenopausal.  Onset of menopause: @ age 49  Abnormal vaginal bleeding/discharge: No  Are you pregnant? No  Reproductive note: 2 children    Review of Systems   Constitutional: Negative for chills, diaphoresis, fever, malaise/fatigue and weight loss.   HENT: Negative for ear pain, nosebleeds and sore throat.    Eyes: Negative for blurred vision, double vision and pain.   Respiratory: Negative for cough and shortness of breath.    Cardiovascular: Negative for chest pain and leg swelling.   Gastrointestinal: Negative for blood in stool, constipation, diarrhea, nausea and vomiting.    Genitourinary: Negative for dysuria, frequency, hematuria and urgency.   Musculoskeletal: Negative for back pain, falls, joint pain and neck pain.   Skin: Negative for rash.   Neurological: Negative for dizziness, tingling, seizures and headaches.   Endo/Heme/Allergies: Does not bruise/bleed easily.   Psychiatric/Behavioral: Negative for depression. The patient is not nervous/anxious and does not have insomnia.      Nurse face-to-face time: Level 4:  15 min phone time    Again, thank you for allowing me to participate in the care of your patient.        Sincerely,        Yesenia Allen MD

## 2021-02-24 NOTE — PROGRESS NOTES
Deirdre is a 51 year old who is being evaluated via a billable telephone visit.      What phone number would you like to be contacted at? [phone]  How would you like to obtain your AVS? [facility]    INITIAL PATIENT ASSESSMENT    Diagnosis: Breast cancer, lumpectomy 2/1/2021    Prior radiation therapy: None    Prior chemotherapy: None    Prior hormonal therapy:No    Pain Eval:  Denies. Breast soreness, OTC medication at at bedtime and PRN.    Psychosocial  Living arrangements: With family  Fall Risk: independent    1. Falls Risk Screening Questions - Radiation consult  2. Have you fallen in the past one week?  No.  3. Have you felt unsteady when walking or standing in the past one week?  No.     referral needs: Not needed    Advanced Directive: No  Implantable Cardiac Device? No    Onset of menarche: @ 11  LMP: No LMP recorded. Patient is postmenopausal.  Onset of menopause: @ age 49  Abnormal vaginal bleeding/discharge: No  Are you pregnant? No  Reproductive note: 2 children    Review of Systems   Constitutional: Negative for chills, diaphoresis, fever, malaise/fatigue and weight loss.   HENT: Negative for ear pain, nosebleeds and sore throat.    Eyes: Negative for blurred vision, double vision and pain.   Respiratory: Negative for cough and shortness of breath.    Cardiovascular: Negative for chest pain and leg swelling.   Gastrointestinal: Negative for blood in stool, constipation, diarrhea, nausea and vomiting.   Genitourinary: Negative for dysuria, frequency, hematuria and urgency.   Musculoskeletal: Negative for back pain, falls, joint pain and neck pain.   Skin: Negative for rash.   Neurological: Negative for dizziness, tingling, seizures and headaches.   Endo/Heme/Allergies: Does not bruise/bleed easily.   Psychiatric/Behavioral: Negative for depression. The patient is not nervous/anxious and does not have insomnia.            Nurse face-to-face time: Level 4:  15 min phone time

## 2021-04-12 NOTE — PROGRESS NOTES
NEW CONSULTATION  2021     Deirdre Jesus is a 51 year old woman who presents with history of left breast DCIS.     HPI:    She was found on screening mammogram in  to have left breast calcifications. Biopsy demonstrated DCIS grade 2, ER/OR positive. She underwent a lumpectomy with Dr. Chiang and radiation therapy. She had negative genetic testing in . She reports healing well. She does have some suture material at he incision and a lump.     BREAST-SPECIFIC HISTORY:    Previous breast imaging: Yes  - 13 Smammo BI-RADS 1  - 16 Smammo BI-RADS 1  - 3/ Smammo BI-RADS 1  - 19 Smammo BI-RADS 1  - 20 Smammo: left breast calcifications BI-RADS 0  - 20 left Dmammo: calcifications BI-RADS 4  - 21 left breast stereotactic biopsy: DCIS, grade 3, ER positive, OR positive  - 21 left wire-localized lumpectomy: DCIS 8 mm, grade 2, negative margins    Prior breast biopsies/surgeries: Yes   - 21 left breast stereotactic biopsy: DCIS, grade 3, ER positive, OR positive  - 21 left wire-localized lumpectomy: DCIS, grade 2, negative margins    Prior history of breast cancer: yes  -  left breast DCIS grade 2, ER positive s/p lumpectomy.   Prior radiation history: No   Self breast exams: yes  Breast density: heterogeneously dense    GYN HISTORY:  . Age at 1st pregnancy: 24. Breastfeeding history: Yes.   Age at menarche:   Menopausal: post menopausal age 49.   Menopausal hormone replacement therapy: No     FAMILY HISTORY:  Breast ca: No  Ovarian ca: No  Pancreatic ca: No  Prostate: Yes   - brother, 65  Gastric ca: No  Melanoma: No  Colon ca: No  Other cancer: No  Other genetic, testing, syndromes, or clotting disorders: no     PAST MEDICAL HISTORY  Past Medical History:   Diagnosis Date     Breast cancer (H) 2020     PAST SURGICAL HISTORY   Past Surgical History:   Procedure Laterality Date     BIOPSY  01    breast biopsy     BUNIONECTOMY Bilateral       "COLONOSCOPY  2018     COLONOSCOPY WITH CO2 INSUFFLATION N/A 10/19/2018    Procedure: colonscopy;  Surgeon: Duane, William Charles, MD;  Location: [facility] OR     LUMPECTOMY BREAST Left 2/1/2021    Procedure: Left wire localized lumpectomy;  Surgeon: Sung Chiang MD;  Location: [facility] OR     ORTHOPEDIC SURGERY  1982    [facility] surgery     MEDICATIONS  Current Outpatient Medications   Medication Sig Dispense Refill     Cholecalciferol (VITAMIN D PO)        multivitamin, therapeutic with minerals (MULTI-VITAMIN) TABS tablet Take 1 tablet by mouth daily       ALLERGIES   No Known Allergies     SOCIAL HISTORY:  Smokes: No  EtOH: No  Exercise: runs      ROS:  Change in vision No  Headaches: no  Respiratory: No shortness of breath, dyspnea on exertion, cough, or hemoptysis   Cardiovascular: negative   Gastrointestinal: negative Abdominal pain: no  Breast: negative  Musculoskeletal: negative Joint pain No Back pain: no  Psychiatric: negative  Hematologic/Lymphatic/Immunologic: negative  Endocrine: negative    EXAM  /77   Pulse 80   Resp 18   Ht 1.74 m (5' 8.5\")   Wt 69.5 kg (153 lb 3.2 oz)   SpO2 97%   BMI 22.96 kg/m     PHYSICAL EXAM  Respiratory: breathing non labored.   Breasts: Examination was done in both the upright and supine positions.  Breasts are symmetrical . No dominant fixed or suspicious masses noted. No skin or nipple changes. No nipple discharge. Left breast scar well healed.   No clavicular, cervical, or axillary lymphadenopathy.     ASSESSMENT/PLAN:    Deirdre Jesus is a 51 year old woman with history of left breast ER positive DCIS s/p lumpectomy. She met with Radiation Therapy but elected to not proceed with Radiation.    1) ER postive DCIS  Discussed she is a candidate for risk-reducing intervention. Counseling was provided with available strategies including lifestyle modifications and risk reducing therapy. We then reviewed the pros and cons of adjuvant endocrine therapy.  We discussed that a 5 " year course of endocrine therapy can reduce future risk of breast cancers as well as distant recurrence. According to the [facility] DCIS nomogram, her risk of developing an ipsilateral breast cancer in the next 10 years is 15%. This risk decreases to 7% with 5 years of endocrine therapy.   - She elected to proceed with lifestyle modifications for risk reduction.      2) Surveillance  History and physical examination every 6-12 months for 5 years, then annually. Mammogram every 12 months.  - Screening mammogram with clinic visit due: 12/3/21    3) Lifestyle Modifications were provided.   - Maintain a healthy weight (BMI 20-25). Your Body mass index is 22.96 kg/m . Higher body mass index (BMI) and adult weight gain is associated with increased risk for breast cancer. This increase in risk has been attributed to increase in circulating endogenous estrogen levels from fat tissue.   - Alcohol consumption, even at moderate levels (1-2 drinks per day), increases breast cancer risk and are best avoided. If you choose to drink alcohol limit alcohol consumption to less than 1 drink per day. (1 ounce of liquor, 6 ounces of wine, or 8 ounces of beer)  - Be active daily and void being sedentary. Take part in at least 150-300 minutes of moderate-intensity physical activity per week.     Jesi Escobar PA-C    30 minutes spent on the date of the encounter doing chart review, review of test results, interpretation of tests, patient visit and documentation.

## 2021-04-14 ENCOUNTER — OFFICE VISIT (OUTPATIENT)
Dept: SURGERY | Facility: CLINIC | Age: 51
End: 2021-04-14
Attending: PHYSICIAN ASSISTANT
Payer: COMMERCIAL

## 2021-04-14 VITALS
OXYGEN SATURATION: 97 % | DIASTOLIC BLOOD PRESSURE: 77 MMHG | BODY MASS INDEX: 22.69 KG/M2 | HEART RATE: 80 BPM | HEIGHT: 69 IN | WEIGHT: 153.2 LBS | SYSTOLIC BLOOD PRESSURE: 121 MMHG | RESPIRATION RATE: 18 BRPM

## 2021-04-14 DIAGNOSIS — D05.12 DUCTAL CARCINOMA IN SITU (DCIS) OF LEFT BREAST: Primary | ICD-10-CM

## 2021-04-14 PROCEDURE — G0463 HOSPITAL OUTPT CLINIC VISIT: HCPCS

## 2021-04-14 PROCEDURE — 99213 OFFICE O/P EST LOW 20 MIN: CPT | Mod: 24 | Performed by: PHYSICIAN ASSISTANT

## 2021-04-14 ASSESSMENT — MIFFLIN-ST. JEOR: SCORE: 1356.35

## 2021-04-14 ASSESSMENT — PAIN SCALES - GENERAL: PAINLEVEL: NO PAIN (0)

## 2021-04-14 NOTE — PATIENT INSTRUCTIONS
Deirdre Jesus is a 51 year old woman with history of left breast ER positive DCIS s/p lumpectomy. She met with Radiation Therapy but elected to not proceed with Radiation.    1) ER postive DCIS  Discussed she is a candidate for risk-reducing intervention. Counseling was provided with available strategies including lifestyle modifications and risk reducing therapy. We then reviewed the pros and cons of adjuvant endocrine therapy.  We discussed that a 5 year course of endocrine therapy can reduce future risk of breast cancers as well as distant recurrence. According to the [facility] DCIS nomogram, her risk of developing an ipsilateral breast cancer in the next 10 years is 15%. This risk decreases to 7% with 5 years of endocrine therapy.   - She elected to proceed with lifestyle modifications for risk reduction.      2) Surveillance  History and physical examination every 6-12 months for 5 years, then annually. Mammogram every 12 months.  - Screening mammogram with clinic visit due: 12/3/21    3) Lifestyle Modifications were provided.   - Maintain a healthy weight (BMI 20-25). Your Body mass index is 22.96 kg/m . Higher body mass index (BMI) and adult weight gain is associated with increased risk for breast cancer. This increase in risk has been attributed to increase in circulating endogenous estrogen levels from fat tissue.   - Alcohol consumption, even at moderate levels (1-2 drinks per day), increases breast cancer risk and are best avoided. If you choose to drink alcohol limit alcohol consumption to less than 1 drink per day. (1 ounce of liquor, 6 ounces of wine, or 8 ounces of beer)  - Be active daily and void being sedentary. Take part in at least 150-300 minutes of moderate-intensity physical activity per week.

## 2021-04-14 NOTE — NURSING NOTE
"Oncology Rooming Note    April 14, 2021 11:47 AM   Deirdre Jesus is a 51 year old female who presents for:    Chief Complaint   Patient presents with     Oncology Clinic Visit     POST LUMPECTOMY      Initial Vitals: Pulse 80   Resp 18   Ht 1.74 m (5' 8.5\")   Wt 69.5 kg (153 lb 3.2 oz)   SpO2 97%   BMI 22.96 kg/m   Estimated body mass index is 22.96 kg/m  as calculated from the following:    Height as of this encounter: 1.74 m (5' 8.5\").    Weight as of this encounter: 69.5 kg (153 lb 3.2 oz). Body surface area is 1.83 meters squared.  No Pain (0) Comment: Data Unavailable   No LMP recorded. Patient is postmenopausal.  Allergies reviewed: Yes  Medications reviewed: Yes    Medications: Medication refills not needed today.  Pharmacy name entered into [employer]: [id number] - [place], [place] - [address] AT [facility]    Clinical concerns: None.       Lety Hurtado MA            "

## 2021-04-14 NOTE — LETTER
2021         RE: Deirdre Jesus  [address] [place] [address]        Dear Colleague,    Thank you for referring your patient, Deirdre Jesus, to the [facility]. Please see a copy of my visit note below.    NEW CONSULTATION  2021     Deirdre Jesus is a 51 year old woman who presents with history of left breast DCIS.     HPI:    She was found on screening mammogram in  to have left breast calcifications. Biopsy demonstrated DCIS grade 2, ER/IA positive. She underwent a lumpectomy with Dr. Chiang and radiation therapy. She had negative genetic testing in . She reports healing well. She does have some suture material at he incision and a lump.     BREAST-SPECIFIC HISTORY:    Previous breast imaging: Yes  - 13 Smammo BI-RADS 1  - 16 Smammo BI-RADS 1  - 3/1/18 Smammo BI-RADS 1  - 19 Smammo BI-RADS 1  - 20 Smammo: left breast calcifications BI-RADS 0  - 20 left Dmammo: calcifications BI-RADS 4  - 21 left breast stereotactic biopsy: DCIS, grade 3, ER positive, IA positive  - 21 left wire-localized lumpectomy: DCIS 8 mm, grade 2, negative margins    Prior breast biopsies/surgeries: Yes   - 21 left breast stereotactic biopsy: DCIS, grade 3, ER positive, IA positive  - 21 left wire-localized lumpectomy: DCIS, grade 2, negative margins    Prior history of breast cancer: yes  -  left breast DCIS grade 2, ER positive s/p lumpectomy.   Prior radiation history: No   Self breast exams: yes  Breast density: heterogeneously dense    GYN HISTORY:  . Age at 1st pregnancy: 24. Breastfeeding history: Yes.   Age at menarche:   Menopausal: post menopausal age 49.   Menopausal hormone replacement therapy: No     FAMILY HISTORY:  Breast ca: No  Ovarian ca: No  Pancreatic ca: No  Prostate: Yes   - brother, 65  Gastric ca: No  Melanoma: No  Colon ca: No  Other cancer: No  Other genetic, testing, syndromes, or clotting  "disorders: no     PAST MEDICAL HISTORY  Past Medical History:   Diagnosis Date     Breast cancer (H) 12/2020     PAST SURGICAL HISTORY   Past Surgical History:   Procedure Laterality Date     BIOPSY  1/5/01    breast biopsy     BUNIONECTOMY Bilateral      COLONOSCOPY  2018     COLONOSCOPY WITH CO2 INSUFFLATION N/A 10/19/2018    Procedure: colonscopy;  Surgeon: Duane, William Charles, MD;  Location: [facility] OR     LUMPECTOMY BREAST Left 2/1/2021    Procedure: Left wire localized lumpectomy;  Surgeon: Sung Chiang MD;  Location: [facility] OR     ORTHOPEDIC SURGERY  1982    bunion surgery     MEDICATIONS  Current Outpatient Medications   Medication Sig Dispense Refill     Cholecalciferol (VITAMIN D PO)        multivitamin, therapeutic with minerals (MULTI-VITAMIN) TABS tablet Take 1 tablet by mouth daily       ALLERGIES   No Known Allergies     SOCIAL HISTORY:  Smokes: No  EtOH: No  Exercise: runs      ROS:  Change in vision No  Headaches: no  Respiratory: No shortness of breath, dyspnea on exertion, cough, or hemoptysis   Cardiovascular: negative   Gastrointestinal: negative Abdominal pain: no  Breast: negative  Musculoskeletal: negative Joint pain No Back pain: no  Psychiatric: negative  Hematologic/Lymphatic/Immunologic: negative  Endocrine: negative    EXAM  /77   Pulse 80   Resp 18   Ht 1.74 m (5' 8.5\")   Wt 69.5 kg (153 lb 3.2 oz)   SpO2 97%   BMI 22.96 kg/m     PHYSICAL EXAM  Respiratory: breathing non labored.   Breasts: Examination was done in both the upright and supine positions.  Breasts are symmetrical . No dominant fixed or suspicious masses noted. No skin or nipple changes. No nipple discharge. Left breast scar well healed.   No clavicular, cervical, or axillary lymphadenopathy.     ASSESSMENT/PLAN:    Deirdre Jesus is a 51 year old woman with history of left breast ER positive DCIS s/p lumpectomy. She met with Radiation Therapy but elected to not proceed with Radiation.    1) ER postive " DCIS  Discussed she is a candidate for risk-reducing intervention. Counseling was provided with available strategies including lifestyle modifications and risk reducing therapy. We then reviewed the pros and cons of adjuvant endocrine therapy.  We discussed that a 5 year course of endocrine therapy can reduce future risk of breast cancers as well as distant recurrence. According to the [facility] DCIS nomogram, her risk of developing an ipsilateral breast cancer in the next 10 years is 15%. This risk decreases to 7% with 5 years of endocrine therapy.   - She elected to proceed with lifestyle modifications for risk reduction.      2) Surveillance  History and physical examination every 6-12 months for 5 years, then annually. Mammogram every 12 months.  - Screening mammogram with clinic visit due: 12/3/21    3) Lifestyle Modifications were provided.   - Maintain a healthy weight (BMI 20-25). Your Body mass index is 22.96 kg/m . Higher body mass index (BMI) and adult weight gain is associated with increased risk for breast cancer. This increase in risk has been attributed to increase in circulating endogenous estrogen levels from fat tissue.   - Alcohol consumption, even at moderate levels (1-2 drinks per day), increases breast cancer risk and are best avoided. If you choose to drink alcohol limit alcohol consumption to less than 1 drink per day. (1 ounce of liquor, 6 ounces of wine, or 8 ounces of beer)  - Be active daily and void being sedentary. Take part in at least 150-300 minutes of moderate-intensity physical activity per week.     Jesi Escobar PA-C    30 minutes spent on the date of the encounter doing chart review, review of test results, interpretation of tests, patient visit and documentation.         Again, thank you for allowing me to participate in the care of your patient.        Sincerely,        Jesi Escobar PA-C

## 2021-08-03 ASSESSMENT — ENCOUNTER SYMPTOMS
DYSURIA: 0
EYE PAIN: 0
COUGH: 0
PALPITATIONS: 0
SORE THROAT: 0
HEMATOCHEZIA: 0
WEAKNESS: 0
SHORTNESS OF BREATH: 0
JOINT SWELLING: 0
MYALGIAS: 0
ARTHRALGIAS: 0
NERVOUS/ANXIOUS: 0
CHILLS: 0
HEMATURIA: 0
DIARRHEA: 0
NAUSEA: 0
BREAST MASS: 0
FREQUENCY: 0
HEARTBURN: 0
CONSTIPATION: 0
HEADACHES: 0
FEVER: 0
DIZZINESS: 0
PARESTHESIAS: 0
ABDOMINAL PAIN: 0

## 2021-08-03 NOTE — PROGRESS NOTES
SUBJECTIVE:   CC: Deirdre Jesus is an 51 year old woman who presents for preventive health visit.       Patient has been advised of split billing requirements and indicates understanding: Yes  Patient was diagnosed with COVID-19 infection 2 weeks ago with primary symptom of cough.  Patient did not lose taste or smell.  She was not hospitalized.  She is improving.  Cough is much better.  She was not immunized with COVID-19 vaccine.    She was diagnosed with breast cancer earlier this year, status post lumpectomy.  She is following with surgical team nurse practitioner for breast exam.  She will have schedule mammogram in December.  She sees OB/GYN for Pap and pelvic exam.    Healthy Habits:     Getting at least 3 servings of Calcium per day:  Yes    Bi-annual eye exam:  Yes    Dental care twice a year:  Yes    Sleep apnea or symptoms of sleep apnea:  None    Diet:  Regular (no restrictions)    Frequency of exercise:  2-3 days/week    Duration of exercise:  15-30 minutes    Medication side effects:  Not applicable    PHQ-2 Total Score: 0    Additional concerns today:  No        Today's PHQ-2 Score:   PHQ-2 ( 1999 Pfizer) 8/3/2021   Q1: Little interest or pleasure in doing things 0   Q2: Feeling down, depressed or hopeless 0   PHQ-2 Score 0   Q1: Little interest or pleasure in doing things Not at all   Q2: Feeling down, depressed or hopeless Not at all   PHQ-2 Score 0       Abuse: Current or Past (Physical, Sexual or Emotional) - No  Do you feel safe in your environment? Yes    Have you ever done Advance Care Planning? (For example, a Health Directive, POLST, or a discussion with a medical provider or your loved ones about your wishes): No, advance care planning information given to patient to review.  Patient declined advance care planning discussion at this time.    Social History     Tobacco Use     Smoking status: Never Smoker     Smokeless tobacco: Never Used   Substance Use Topics     Alcohol use: No  "        Alcohol Use 8/3/2021   Prescreen: >3 drinks/day or >7 drinks/week? Not Applicable       Reviewed orders with patient.  Reviewed health maintenance and updated orders accordingly - Yes  Lab work is in process    Breast Cancer Screening:  Any new diagnosis of family breast, ovarian, or bowel cancer? No    FHS-7: No flowsheet data found.    Mammogram Screening: Recommended annual mammography  Pertinent mammograms are reviewed under the imaging tab.    History of abnormal Pap smear: NO - age 30-65 PAP every 5 years with negative HPV co-testing recommended     Reviewed and updated as needed this visit by clinical staff   Allergies  Meds              Reviewed and updated as needed this visit by Provider                    Review of Systems   Constitutional: Negative for chills and fever.   HENT: Negative for congestion, ear pain, hearing loss and sore throat.    Eyes: Negative for pain and visual disturbance.   Respiratory: Negative for cough and shortness of breath.    Cardiovascular: Negative for chest pain, palpitations and peripheral edema.   Gastrointestinal: Negative for abdominal pain, constipation, diarrhea, heartburn, hematochezia and nausea.   Breasts:  Negative for tenderness, breast mass and discharge.   Genitourinary: Negative for dysuria, frequency, genital sores, hematuria, pelvic pain, urgency, vaginal bleeding and vaginal discharge.   Musculoskeletal: Negative for arthralgias, joint swelling and myalgias.   Skin: Negative for rash.   Neurological: Negative for dizziness, weakness, headaches and paresthesias.   Psychiatric/Behavioral: Negative for mood changes. The patient is not nervous/anxious.           OBJECTIVE:   /68   Pulse 66   Temp 97.6  F (36.4  C) (Oral)   Ht 1.727 m (5' 8\")   Wt 65.1 kg (143 lb 9.6 oz)   SpO2 98%   BMI 21.83 kg/m    Physical Exam  GENERAL APPEARANCE: healthy, alert and no distress  EYES: Eyes grossly normal to inspection, PERRL and conjunctivae and sclerae " "normal  RESP: lungs clear to auscultation - no rales, rhonchi or wheezes  CV: regular rate and rhythm, normal S1 S2, no S3 or S4, no murmur, click or rub, no peripheral edema and peripheral pulses strong  MS: no musculoskeletal defects are noted and gait is age appropriate without ataxia  SKIN: no suspicious lesions or rashes  NEURO: Normal strength and tone, sensory exam grossly normal, mentation intact and speech normal  PSYCH: mentation appears normal and affect normal/bright    Diagnostic Test Results:  No results found for this or any previous visit (from the past 24 hour(s)).    ASSESSMENT/PLAN:   Deirdre was seen today for physical.    Diagnoses and all orders for this visit:    Routine general medical examination at a health care facility    Encounter for hepatitis C screening test for low risk patient  -     Hepatitis C antibody; Future  -     Hepatitis C antibody    Lipid screening  -     Lipid panel reflex to direct LDL Fasting; Future  -     Lipid panel reflex to direct LDL Fasting    Diabetes mellitus screening  -     GLUCOSE; Future  -     GLUCOSE    Familial hemochromatosis (H)  -     Ferritin; Future  -     Hemoglobin; Future  -     Hemoglobin  -     Ferritin    Infection due to 2019 novel coronavirus  Comments:  She may get COVID-19 vaccine 90 days after diagnosis COVID-19 infection.  This recommendation may change.  F/Udiscussion in 2 month via MyChart    Ductal carcinoma in situ (DCIS) of left breast  Comments:  Continue to follow with breast cancer surgical team and mammogram in December        Patient has been advised of split billing requirements and indicates understanding: No  COUNSELING:  Reviewed preventive health counseling, as reflected in patient instructions    Estimated body mass index is 21.83 kg/m  as calculated from the following:    Height as of this encounter: 1.727 m (5' 8\").    Weight as of this encounter: 65.1 kg (143 lb 9.6 oz).        She reports that she has never smoked. " She has never used smokeless tobacco.      Counseling Resources:  ATP IV Guidelines  Pooled Cohorts Equation Calculator  Breast Cancer Risk Calculator  BRCA-Related Cancer Risk Assessment: FHS-7 Tool  FRAX Risk Assessment  ICSI Preventive Guidelines  Dietary Guidelines for Americans, 2010  USDA's MyPlate  ASA Prophylaxis  Lung CA Screening    Oneal Fonseca MD PhD  [facility]

## 2021-08-04 ENCOUNTER — OFFICE VISIT (OUTPATIENT)
Dept: FAMILY MEDICINE | Facility: CLINIC | Age: 51
End: 2021-08-04
Payer: COMMERCIAL

## 2021-08-04 VITALS
SYSTOLIC BLOOD PRESSURE: 104 MMHG | HEIGHT: 68 IN | BODY MASS INDEX: 21.76 KG/M2 | OXYGEN SATURATION: 98 % | HEART RATE: 66 BPM | TEMPERATURE: 97.6 F | DIASTOLIC BLOOD PRESSURE: 68 MMHG | WEIGHT: 143.6 LBS

## 2021-08-04 DIAGNOSIS — Z13.1 DIABETES MELLITUS SCREENING: ICD-10-CM

## 2021-08-04 DIAGNOSIS — Z13.220 LIPID SCREENING: ICD-10-CM

## 2021-08-04 DIAGNOSIS — Z00.00 ROUTINE GENERAL MEDICAL EXAMINATION AT A HEALTH CARE FACILITY: Primary | ICD-10-CM

## 2021-08-04 DIAGNOSIS — U07.1 INFECTION DUE TO 2019 NOVEL CORONAVIRUS: ICD-10-CM

## 2021-08-04 DIAGNOSIS — E83.110 FAMILIAL HEMOCHROMATOSIS (H): ICD-10-CM

## 2021-08-04 DIAGNOSIS — D05.12 DUCTAL CARCINOMA IN SITU (DCIS) OF LEFT BREAST: ICD-10-CM

## 2021-08-04 DIAGNOSIS — Z11.59 ENCOUNTER FOR HEPATITIS C SCREENING TEST FOR LOW RISK PATIENT: ICD-10-CM

## 2021-08-04 LAB
CHOLEST SERPL-MCNC: 204 MG/DL
FASTING STATUS PATIENT QL REPORTED: YES
FASTING STATUS PATIENT QL REPORTED: YES
FERRITIN SERPL-MCNC: 296 NG/ML (ref 8–252)
GLUCOSE BLD-MCNC: 96 MG/DL (ref 70–99)
HDLC SERPL-MCNC: 57 MG/DL
HGB BLD-MCNC: 12.9 G/DL (ref 11.7–15.7)
LDLC SERPL CALC-MCNC: 134 MG/DL
NONHDLC SERPL-MCNC: 147 MG/DL
TRIGL SERPL-MCNC: 64 MG/DL

## 2021-08-04 PROCEDURE — 80061 LIPID PANEL: CPT | Performed by: INTERNAL MEDICINE

## 2021-08-04 PROCEDURE — 86803 HEPATITIS C AB TEST: CPT | Performed by: INTERNAL MEDICINE

## 2021-08-04 PROCEDURE — 36415 COLL VENOUS BLD VENIPUNCTURE: CPT | Performed by: INTERNAL MEDICINE

## 2021-08-04 PROCEDURE — 82728 ASSAY OF FERRITIN: CPT | Performed by: INTERNAL MEDICINE

## 2021-08-04 PROCEDURE — 82947 ASSAY GLUCOSE BLOOD QUANT: CPT | Performed by: INTERNAL MEDICINE

## 2021-08-04 PROCEDURE — 85018 HEMOGLOBIN: CPT | Performed by: INTERNAL MEDICINE

## 2021-08-04 PROCEDURE — 99396 PREV VISIT EST AGE 40-64: CPT | Performed by: INTERNAL MEDICINE

## 2021-08-04 ASSESSMENT — MIFFLIN-ST. JEOR: SCORE: 1304.87

## 2021-08-05 LAB — HCV AB SERPL QL IA: NONREACTIVE

## 2021-08-05 NOTE — RESULT ENCOUNTER NOTE
Dear Deirdre,   Your recent test result are within acceptable range or at baseline. Please continue with your current plan of care.       Please call or Mychart to our office if you have further questions.     Oneal Fonseca MD-PhD
Dear Deirdre,   Your recent test results showed the following:  -- hemoglobin is normal.  -- ferritin is elevated however, this is likely from recent Covid-19 infection rather than hemachromatosis since the hemoglobin is normal. Ferritin is also known as an acute phase reaction that can be elevated from inflammation/infection.   -- I recommend recheck in ferritin in 3 months.     Please call or Mychart to our office if you have further questions.     Oneal Fonseca MD-PhD
no abdominal pain, no bloating, no constipation, no diarrhea, no nausea and no vomiting.

## 2021-08-23 ENCOUNTER — ANCILLARY PROCEDURE (OUTPATIENT)
Dept: ULTRASOUND IMAGING | Facility: CLINIC | Age: 51
End: 2021-08-23
Attending: OBSTETRICS & GYNECOLOGY
Payer: COMMERCIAL

## 2021-08-23 DIAGNOSIS — R19.00 PELVIC MASS: ICD-10-CM

## 2021-08-23 PROCEDURE — 76856 US EXAM PELVIC COMPLETE: CPT | Performed by: RADIOLOGY

## 2021-08-23 PROCEDURE — 76830 TRANSVAGINAL US NON-OB: CPT | Performed by: RADIOLOGY

## 2021-08-23 PROCEDURE — 93976 VASCULAR STUDY: CPT | Mod: 59 | Performed by: RADIOLOGY

## 2021-10-06 ENCOUNTER — OFFICE VISIT (OUTPATIENT)
Dept: DERMATOLOGY | Facility: CLINIC | Age: 51
End: 2021-10-06
Payer: COMMERCIAL

## 2021-10-06 VITALS — OXYGEN SATURATION: 98 % | HEART RATE: 76 BPM | DIASTOLIC BLOOD PRESSURE: 76 MMHG | SYSTOLIC BLOOD PRESSURE: 117 MMHG

## 2021-10-06 DIAGNOSIS — L65.0 TELOGEN EFFLUVIUM: ICD-10-CM

## 2021-10-06 DIAGNOSIS — L81.4 LENTIGO: ICD-10-CM

## 2021-10-06 DIAGNOSIS — L82.1 SEBORRHEIC KERATOSIS: ICD-10-CM

## 2021-10-06 DIAGNOSIS — L81.6 POIKILODERMA: Primary | ICD-10-CM

## 2021-10-06 PROCEDURE — 99243 OFF/OP CNSLTJ NEW/EST LOW 30: CPT | Performed by: DERMATOLOGY

## 2021-10-06 NOTE — NURSING NOTE
Chief Complaint   Patient presents with     Derm Problem     spot on nose and hair loss        Savannah Mays on 10/6/2021 at 2:04 PM

## 2021-10-06 NOTE — PROGRESS NOTES
Deirdre Jesus , a 51 year old year old female patient, I was asked to see by Dr. Fonseca for spot on face and hair shedding.  Patient states this has been present for months.  Patient reports the following symptoms:  Shedding of hair, after COVID 3 months ago, and 12 lbs of weight loss.   .  Patient reports the following previous treatments none.  Patient reports the following modifying factors none.  Associated symptoms: none.  Patient has no other skin complaints today.  Remainder of the HPI, Meds, PMH, Allergies, FH, and SH was reviewed in chart.      Past Medical History:   Diagnosis Date     Breast cancer (H) 12/2020       Past Surgical History:   Procedure Laterality Date     BIOPSY  1/5/01    breast biopsy     BUNIONECTOMY Bilateral      COLONOSCOPY  2018     COLONOSCOPY WITH CO2 INSUFFLATION N/A 10/19/2018    Procedure: colonscopy;  Surgeon: Duane, William Charles, MD;  Location: [facility] OR     LUMPECTOMY BREAST Left 2/1/2021    Procedure: Left wire localized lumpectomy;  Surgeon: Sung Chiang MD;  Location: [facility] OR     ORTHOPEDIC SURGERY  1982    [facility] surgery        Family History   Problem Relation Age of Onset     Coronary Artery Disease Father 53        MI Fatal      Hyperlipidemia Brother      Hyperlipidemia Brother      Prostate Cancer Brother      Coronary Artery Disease Maternal Grandmother      Coronary Artery Disease Maternal Grandfather      Diabetes No family hx of      Colon Cancer No family hx of      Breast Cancer No family hx of        Social History     Socioeconomic History     Marital status:      Spouse name: Not on file     Number of children: 2     Years of education: Not on file     Highest education level: Not on file   Occupational History     Not on file   Tobacco Use     Smoking status: Never Smoker     Smokeless tobacco: Never Used   Substance and Sexual Activity     Alcohol use: No     Drug use: No     Sexual activity: Yes     Partners: Male     Birth control/protection:  Post-menopausal   Other Topics Concern     Parent/sibling w/ CABG, MI or angioplasty before 65F 55M? Yes     Comment: father   Social History Narrative     Not on file     Social Determinants of Health     Financial Resource Strain:      Difficulty of Paying Living Expenses:    Food Insecurity:      Worried About Running Out of Food in the Last Year:      Ran Out of Food in the Last Year:    Transportation Needs:      Lack of Transportation (Medical):      Lack of Transportation (Non-Medical):    Physical Activity:      Days of Exercise per Week:      Minutes of Exercise per Session:    Stress:      Feeling of Stress :    Social Connections:      Frequency of Communication with Friends and Family:      Frequency of Social Gatherings with Friends and Family:      Attends Mosque Services:      Active Member of Clubs or Organizations:      Attends Club or Organization Meetings:      Marital Status:    Intimate Partner Violence:      Fear of Current or Ex-Partner:      Emotionally Abused:      Physically Abused:      Sexually Abused:        Outpatient Encounter Medications as of 10/6/2021   Medication Sig Dispense Refill     Cholecalciferol (VITAMIN D PO)        multivitamin, therapeutic with minerals (MULTI-VITAMIN) TABS tablet Take 1 tablet by mouth daily       No facility-administered encounter medications on file as of 10/6/2021.             Review Of Systems  Skin: As above  Eyes: negative  Ears/Nose/Throat: negative  Respiratory: No shortness of breath, dyspnea on exertion, cough, or hemoptysis  Cardiovascular: negative  Gastrointestinal: negative  Genitourinary: negative  Musculoskeletal: negative  Neurologic: negative  Psychiatric: negative  Hematologic/Lymphatic/Immunologic: negative  Endocrine: negative      O:   NAD, WDWN, Alert & Oriented, Mood & Affect wnl, Vitals stable   Here today alone   /76 (BP Location: Left arm, Patient Position: Sitting, Cuff Size: Adult Regular)   Pulse 76   SpO2 98%     General appearance cori ii   Vitals stable   Alert, oriented and in no acute distress   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 Seborrheic keratosis, lentigo, poikiloderma  2. Favor telogen effluvium  Pathophysiology discussed with pateint   Hair growth discussed with patient   Start vivascal daily  Return to clinic 3 months  It was a pleasure speaking to Deirdre Jesus today.  Previous clinic  notes and pertinent laboratory tests were reviewed prior to Deirdre Jesus's visit.  Nature and genetics of benign skin lesions dicussed with patient.  UV precautions reviewed with patient.

## 2021-10-06 NOTE — LETTER
10/6/2021         RE: Deirdre Jesus  [address] [place] [address]        Dear Colleague,    Thank you for referring your patient, Deirdre Jesus, to the [facility]. Please see a copy of my visit note below.    Deirdre Jesus , a 51 year old year old female patient, I was asked to see by Dr. Fonseca for spot on face and hair shedding.  Patient states this has been present for months.  Patient reports the following symptoms:  Shedding of hair, after COVID 3 months ago, and 12 lbs of weight loss.   .  Patient reports the following previous treatments none.  Patient reports the following modifying factors none.  Associated symptoms: none.  Patient has no other skin complaints today.  Remainder of the HPI, Meds, PMH, Allergies, FH, and SH was reviewed in chart.      Past Medical History:   Diagnosis Date     Breast cancer (H) 12/2020       Past Surgical History:   Procedure Laterality Date     BIOPSY  1/5/01    breast biopsy     BUNIONECTOMY Bilateral      COLONOSCOPY  2018     COLONOSCOPY WITH CO2 INSUFFLATION N/A 10/19/2018    Procedure: colonscopy;  Surgeon: Duane, William Charles, MD;  Location: [facility] OR     LUMPECTOMY BREAST Left 2/1/2021    Procedure: Left wire localized lumpectomy;  Surgeon: Sung Chiang MD;  Location:  OR     ORTHOPEDIC SURGERY  1982    [facility] surgery        Family History   Problem Relation Age of Onset     Coronary Artery Disease Father 53        MI Fatal      Hyperlipidemia Brother      Hyperlipidemia Brother      Prostate Cancer Brother      Coronary Artery Disease Maternal Grandmother      Coronary Artery Disease Maternal Grandfather      Diabetes No family hx of      Colon Cancer No family hx of      Breast Cancer No family hx of        Social History     Socioeconomic History     Marital status:      Spouse name: Not on file     Number of children: 2     Years of education: Not on file     Highest education level: Not on file   Occupational  History     Not on file   Tobacco Use     Smoking status: Never Smoker     Smokeless tobacco: Never Used   Substance and Sexual Activity     Alcohol use: No     Drug use: No     Sexual activity: Yes     Partners: Male     Birth control/protection: Post-menopausal   Other Topics Concern     Parent/sibling w/ CABG, MI or angioplasty before 65F 55M? Yes     Comment: father   Social History Narrative     Not on file     Social Determinants of Health     Financial Resource Strain:      Difficulty of Paying Living Expenses:    Food Insecurity:      Worried About Running Out of Food in the Last Year:      Ran Out of Food in the Last Year:    Transportation Needs:      Lack of Transportation (Medical):      Lack of Transportation (Non-Medical):    Physical Activity:      Days of Exercise per Week:      Minutes of Exercise per Session:    Stress:      Feeling of Stress :    Social Connections:      Frequency of Communication with Friends and Family:      Frequency of Social Gatherings with Friends and Family:      Attends Hinduism Services:      Active Member of Clubs or Organizations:      Attends Club or Organization Meetings:      Marital Status:    Intimate Partner Violence:      Fear of Current or Ex-Partner:      Emotionally Abused:      Physically Abused:      Sexually Abused:        Outpatient Encounter Medications as of 10/6/2021   Medication Sig Dispense Refill     Cholecalciferol (VITAMIN D PO)        multivitamin, therapeutic with minerals (MULTI-VITAMIN) TABS tablet Take 1 tablet by mouth daily       No facility-administered encounter medications on file as of 10/6/2021.             Review Of Systems  Skin: As above  Eyes: negative  Ears/Nose/Throat: negative  Respiratory: No shortness of breath, dyspnea on exertion, cough, or hemoptysis  Cardiovascular: negative  Gastrointestinal: negative  Genitourinary: negative  Musculoskeletal: negative  Neurologic: negative  Psychiatric:  negative  Hematologic/Lymphatic/Immunologic: negative  Endocrine: negative      O:   NAD, WDWN, Alert & Oriented, Mood & Affect wnl, Vitals stable   Here today alone   /76 (BP Location: Left arm, Patient Position: Sitting, Cuff Size: Adult Regular)   Pulse 76   SpO2 98%    General appearance cori ii   Vitals stable   Alert, oriented and in no acute distress   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 Seborrheic keratosis, lentigo, poikiloderma  2. Favor telogen effluvium  Pathophysiology discussed with pateint   Hair growth discussed with patient   Start vivascal daily  Return to clinic 3 months  It was a pleasure speaking to Deirdre Jesus today.  Previous clinic  notes and pertinent laboratory tests were reviewed prior to Deirdre Jesus's visit.  Nature and genetics of benign skin lesions dicussed with patient.  UV precautions reviewed with patient.        Again, thank you for allowing me to participate in the care of your patient.        Sincerely,        Luciano Smith MD     CONSTITUTIONAL: Well-developed; well-nourished; in no acute distress.   SKIN: warm, dry  HEAD: Normocephalic; atraumatic.  EYES: PERRL, EOMI, no conjunctival erythema  ENT: No nasal discharge; airway clear.  NECK: Supple; non tender.  CARD: S1, S2 normal; no murmurs, gallops, or rubs. Regular rate and rhythm.   RESP: No wheezes, rales or rhonchi.  ABD: soft ntnd  EXT: Normal ROM.  No clubbing, cyanosis or edema.   LYMPH: No acute cervical adenopathy.  NEURO: Alert, oriented, grossly unremarkable  PSYCH: Cooperative, appropriate.

## 2021-10-24 ENCOUNTER — HEALTH MAINTENANCE LETTER (OUTPATIENT)
Age: 51
End: 2021-10-24

## 2021-12-13 NOTE — PROGRESS NOTES
FOLLOW UP  Dec 15, 2021     Deirdre Jesus is a 51 year old woman who presents with history of left breast DCIS.      HPI:     She was found on screening mammogram in  to have left breast calcifications. Biopsy demonstrated DCIS grade 2, ER/NH positive. She underwent a lumpectomy with Dr. Chiang. She elected not to have radiation or endocrine therapy.  She had negative genetic testing in .    Today she denies any breast concerns, mass, skin change, nipple inversion or nipple discharge.      BREAST-SPECIFIC HISTORY:     Previous breast imaging: Yes  - 13 Smammo BI-RADS 1  - 16 Smammo BI-RADS 1  - 3/1/18 Smammo BI-RADS 1  - 19 Smammo BI-RADS 1  - 20 Smammo: left breast calcifications BI-RADS 0  - 20 left Dmammo: calcifications BI-RADS 4  - 21 left breast stereotactic biopsy: DCIS, grade 3, ER positive, NH positive  - 21 left wire-localized lumpectomy: DCIS 8 mm, grade 2, negative margins     Prior breast biopsies/surgeries: Yes   - 21 left breast stereotactic biopsy: DCIS, grade 3, ER positive, NH positive  - 21 left wire-localized lumpectomy: DCIS, grade 2, negative margins     Prior history of breast cancer: yes  -  left breast DCIS grade 2, ER positive s/p lumpectomy.   Prior radiation history: No   Self breast exams: yes  Breast density: heterogeneously dense     GYN HISTORY:  . Age at 1st pregnancy: 24. Breastfeeding history: Yes.   Age at menarche:   Menopausal: post menopausal age 49.   Menopausal hormone replacement therapy: No      FAMILY HISTORY:  Breast ca: No  Ovarian ca: No  Pancreatic ca: No  Prostate: Yes   - brother, 65  Gastric ca: No  Melanoma: No  Colon ca: No  Other cancer: No  Other genetic, testing, syndromes, or clotting disorders: no     PAST MEDICAL HISTORY  Past Medical History:   Diagnosis Date     Breast cancer (H) 2020     PAST SURGICAL HISTORY   Past Surgical History:   Procedure Laterality Date     BIOPSY  01    breast  "biopsy     BUNIONECTOMY Bilateral      COLONOSCOPY  2018     COLONOSCOPY WITH CO2 INSUFFLATION N/A 10/19/2018    Procedure: colonscopy;  Surgeon: Duane, William Charles, MD;  Location: [facility]     LUMPECTOMY BREAST Left 2/1/2021    Procedure: Left wire localized lumpectomy;  Surgeon: Sung Chiang MD;  Location: [facility]     ORTHOPEDIC SURGERY  1982    bunion surgery     MEDICATIONS  Current Outpatient Medications   Medication Sig Dispense Refill     Cholecalciferol (VITAMIN D PO)        multivitamin, therapeutic with minerals (MULTI-VITAMIN) TABS tablet Take 1 tablet by mouth daily        ALLERGIES   No Known Allergies     SOCIAL HISTORY:  Smokes: No  EtOH: No  Exercise: runs      ROS:  Change in vision No  Headaches: n  Respiratory: No shortness of breath, dyspnea on exertion, cough, or hemoptysis   Cardiovascular: negative   Gastrointestinal: negative Abdominal pain: no  Breast: negative  Musculoskeletal: negative Joint pain No Back pain: no  Psychiatric: negative  Hematologic/Lymphatic/Immunologic: negative  Endocrine: negative    EXAM  /66   Pulse 88   Temp 98.1  F (36.7  C)   Resp 16   Ht 1.727 m (5' 7.99\")   Wt 65.7 kg (144 lb 14.4 oz)   SpO2 97%   BMI 22.04 kg/m     PHYSICAL EXAM  Respiratory: breathing non labored.   Breasts: Examination was done in both the upright and supine positions.  Breasts are symmetrical . No dominant fixed or suspicious masses noted. No skin or nipple changes. No nipple discharge. Left breast scar well healed.   No clavicular, cervical, or axillary lymphadenopathy.     INVESTIGATIONS:    12/15/21 screening mammogram, results pending.     ASSESSMENT/PLAN:    Deirdre Jesus is a 51 year old woman with history of left breast ER positive DCIS s/p lumpectomy.      1) Surveillance  History and physical examination every 6-12 months for 5 years, then annually. Mammogram every 12 months.  - Breast exam due 6/2022  - Screening mammogram with same day clinic visit due: " 12/16/22    2) She elected to proceed with lifestyle modifications for risk reduction.   - Maintain a healthy weight (BMI 20-25). Higher body mass index (BMI) and adult weight gain is associated with increased risk for breast cancer. This increase in risk has been attributed to increase in circulating endogenous estrogen levels from fat tissue.   - Alcohol consumption, even at moderate levels (1-2 drinks per day), increases breast cancer risk and are best avoided. If you choose to drink alcohol limit alcohol consumption to less than 1 drink per day. (1 ounce of liquor, 6 ounces of wine, or 8 ounces of beer)  - Be active daily and void being sedentary. Take part in at least 150-300 minutes of moderate-intensity physical activity per week.     Jesi Escobar PA-C    20 minutes spent on the date of the encounter doing chart review, review of test results, interpretation of tests, patient visit and documentation.

## 2021-12-15 ENCOUNTER — OFFICE VISIT (OUTPATIENT)
Dept: SURGERY | Facility: CLINIC | Age: 51
End: 2021-12-15
Attending: PHYSICIAN ASSISTANT
Payer: COMMERCIAL

## 2021-12-15 ENCOUNTER — ANCILLARY PROCEDURE (OUTPATIENT)
Dept: MAMMOGRAPHY | Facility: CLINIC | Age: 51
End: 2021-12-15
Payer: COMMERCIAL

## 2021-12-15 VITALS
HEIGHT: 68 IN | TEMPERATURE: 98.1 F | SYSTOLIC BLOOD PRESSURE: 110 MMHG | DIASTOLIC BLOOD PRESSURE: 66 MMHG | WEIGHT: 144.9 LBS | RESPIRATION RATE: 16 BRPM | OXYGEN SATURATION: 97 % | BODY MASS INDEX: 21.96 KG/M2 | HEART RATE: 88 BPM

## 2021-12-15 DIAGNOSIS — Z12.31 VISIT FOR SCREENING MAMMOGRAM: ICD-10-CM

## 2021-12-15 DIAGNOSIS — D05.12 DUCTAL CARCINOMA IN SITU (DCIS) OF LEFT BREAST: Primary | ICD-10-CM

## 2021-12-15 PROCEDURE — 77063 BREAST TOMOSYNTHESIS BI: CPT | Mod: GC | Performed by: RADIOLOGY

## 2021-12-15 PROCEDURE — G0463 HOSPITAL OUTPT CLINIC VISIT: HCPCS

## 2021-12-15 PROCEDURE — 99213 OFFICE O/P EST LOW 20 MIN: CPT | Performed by: PHYSICIAN ASSISTANT

## 2021-12-15 PROCEDURE — 77067 SCR MAMMO BI INCL CAD: CPT | Mod: GC | Performed by: RADIOLOGY

## 2021-12-15 ASSESSMENT — MIFFLIN-ST. JEOR: SCORE: 1310.63

## 2021-12-15 ASSESSMENT — PAIN SCALES - GENERAL: PAINLEVEL: NO PAIN (0)

## 2021-12-15 NOTE — PATIENT INSTRUCTIONS
Deirdre Jesus is a 51 year old woman with history of left breast ER positive DCIS s/p lumpectomy.      1) Surveillance  History and physical examination every 6-12 months for 5 years, then annually. Mammogram every 12 months.  - Breast exam due 6/2022  - Screening mammogram with same day clinic visit due: 12/16/22    2) She elected to proceed with lifestyle modifications for risk reduction.   - Maintain a healthy weight (BMI 20-25). Higher body mass index (BMI) and adult weight gain is associated with increased risk for breast cancer. This increase in risk has been attributed to increase in circulating endogenous estrogen levels from fat tissue.   - Alcohol consumption, even at moderate levels (1-2 drinks per day), increases breast cancer risk and are best avoided. If you choose to drink alcohol limit alcohol consumption to less than 1 drink per day. (1 ounce of liquor, 6 ounces of wine, or 8 ounces of beer)  - Be active daily and void being sedentary. Take part in at least 150-300 minutes of moderate-intensity physical activity per week.

## 2021-12-15 NOTE — NURSING NOTE
"Oncology Rooming Note    December 15, 2021 12:12 PM   Deirdre Jesus is a 51 year old female who presents for:    Chief Complaint   Patient presents with     Oncology Clinic Visit     [facility] RETURN - DCIS     Initial Vitals: /66   Pulse 88   Temp 98.1  F (36.7  C)   Resp 16   Ht 1.727 m (5' 7.99\")   Wt 65.7 kg (144 lb 14.4 oz)   SpO2 97%   BMI 22.04 kg/m   Estimated body mass index is 22.04 kg/m  as calculated from the following:    Height as of this encounter: 1.727 m (5' 7.99\").    Weight as of this encounter: 65.7 kg (144 lb 14.4 oz). Body surface area is 1.78 meters squared.  No Pain (0) Comment: Data Unavailable   No LMP recorded. Patient is postmenopausal.  Allergies reviewed: Yes  Medications reviewed: Yes    Medications: Medication refills not needed today.  Pharmacy name entered into [employer]: [id number] - [place], [place] - [address] [facility]    Clinical concerns:  No new concerns. Luz was notified.      Tato Castellon LPN            "

## 2021-12-15 NOTE — LETTER
12/15/2021         RE: Deirdre Jesus  [address] [place] [address]        Dear Colleague,    Thank you for referring your patient, Deirdre Jesus, to the [facility]. Please see a copy of my visit note below.    FOLLOW UP  Dec 15, 2021     Deirdre Jesus is a 51 year old woman who presents with history of left breast DCIS.      HPI:     She was found on screening mammogram in  to have left breast calcifications. Biopsy demonstrated DCIS grade 2, ER/ID positive. She underwent a lumpectomy with Dr. Chiang. She elected not to have radiation or endocrine therapy.  She had negative genetic testing in .    Today she denies any breast concerns, mass, skin change, nipple inversion or nipple discharge.      BREAST-SPECIFIC HISTORY:     Previous breast imaging: Yes  - 13 Smammo BI-RADS 1  - 16 Smammo BI-RADS 1  - 3/1/18 Smammo BI-RADS 1  - 19 Smammo BI-RADS 1  - 20 Smammo: left breast calcifications BI-RADS 0  - 20 left Dmammo: calcifications BI-RADS 4  - 21 left breast stereotactic biopsy: DCIS, grade 3, ER positive, ID positive  - 21 left wire-localized lumpectomy: DCIS 8 mm, grade 2, negative margins     Prior breast biopsies/surgeries: Yes   - 21 left breast stereotactic biopsy: DCIS, grade 3, ER positive, ID positive  - 21 left wire-localized lumpectomy: DCIS, grade 2, negative margins     Prior history of breast cancer: yes  -  left breast DCIS grade 2, ER positive s/p lumpectomy.   Prior radiation history: No   Self breast exams: yes  Breast density: heterogeneously dense     GYN HISTORY:  . Age at 1st pregnancy: 24. Breastfeeding history: Yes.   Age at menarche:   Menopausal: post menopausal age 49.   Menopausal hormone replacement therapy: No      FAMILY HISTORY:  Breast ca: No  Ovarian ca: No  Pancreatic ca: No  Prostate: Yes   - brother, 65  Gastric ca: No  Melanoma: No  Colon ca: No  Other cancer: No  Other  "genetic, testing, syndromes, or clotting disorders: no     PAST MEDICAL HISTORY  Past Medical History:   Diagnosis Date     Breast cancer (H) 12/2020     PAST SURGICAL HISTORY   Past Surgical History:   Procedure Laterality Date     BIOPSY  1/5/01    breast biopsy     BUNIONECTOMY Bilateral      COLONOSCOPY  2018     COLONOSCOPY WITH CO2 INSUFFLATION N/A 10/19/2018    Procedure: colonscopy;  Surgeon: Duane, William Charles, MD;  Location: [facility]     LUMPECTOMY BREAST Left 2/1/2021    Procedure: Left wire localized lumpectomy;  Surgeon: Sung Chiang MD;  Location: [facility]     ORTHOPEDIC SURGERY  1982    bunion surgery     MEDICATIONS  Current Outpatient Medications   Medication Sig Dispense Refill     Cholecalciferol (VITAMIN D PO)        multivitamin, therapeutic with minerals (MULTI-VITAMIN) TABS tablet Take 1 tablet by mouth daily        ALLERGIES   No Known Allergies     SOCIAL HISTORY:  Smokes: No  EtOH: No  Exercise: runs      ROS:  Change in vision No  Headaches: n  Respiratory: No shortness of breath, dyspnea on exertion, cough, or hemoptysis   Cardiovascular: negative   Gastrointestinal: negative Abdominal pain: no  Breast: negative  Musculoskeletal: negative Joint pain No Back pain: no  Psychiatric: negative  Hematologic/Lymphatic/Immunologic: negative  Endocrine: negative    EXAM  /66   Pulse 88   Temp 98.1  F (36.7  C)   Resp 16   Ht 1.727 m (5' 7.99\")   Wt 65.7 kg (144 lb 14.4 oz)   SpO2 97%   BMI 22.04 kg/m     PHYSICAL EXAM  Respiratory: breathing non labored.   Breasts: Examination was done in both the upright and supine positions.  Breasts are symmetrical . No dominant fixed or suspicious masses noted. No skin or nipple changes. No nipple discharge. Left breast scar well healed.   No clavicular, cervical, or axillary lymphadenopathy.     INVESTIGATIONS:    12/15/21 screening mammogram, results pending.     ASSESSMENT/PLAN:    Deirdre Jesus is a 51 year old woman with history of left " breast ER positive DCIS s/p lumpectomy.      1) Surveillance  History and physical examination every 6-12 months for 5 years, then annually. Mammogram every 12 months.  - Breast exam due 6/2022  - Screening mammogram with same day clinic visit due: 12/16/22    2) She elected to proceed with lifestyle modifications for risk reduction.   - Maintain a healthy weight (BMI 20-25). Higher body mass index (BMI) and adult weight gain is associated with increased risk for breast cancer. This increase in risk has been attributed to increase in circulating endogenous estrogen levels from fat tissue.   - Alcohol consumption, even at moderate levels (1-2 drinks per day), increases breast cancer risk and are best avoided. If you choose to drink alcohol limit alcohol consumption to less than 1 drink per day. (1 ounce of liquor, 6 ounces of wine, or 8 ounces of beer)  - Be active daily and void being sedentary. Take part in at least 150-300 minutes of moderate-intensity physical activity per week.     Jesi Escobar PA-C    20 minutes spent on the date of the encounter doing chart review, review of test results, interpretation of tests, patient visit and documentation.         Again, thank you for allowing me to participate in the care of your patient.        Sincerely,        Jesi Escobar PA-C

## 2022-03-07 ENCOUNTER — OFFICE VISIT (OUTPATIENT)
Dept: DERMATOLOGY | Facility: CLINIC | Age: 52
End: 2022-03-07
Payer: COMMERCIAL

## 2022-03-07 VITALS
SYSTOLIC BLOOD PRESSURE: 119 MMHG | BODY MASS INDEX: 22.03 KG/M2 | HEIGHT: 68 IN | HEART RATE: 77 BPM | DIASTOLIC BLOOD PRESSURE: 75 MMHG

## 2022-03-07 DIAGNOSIS — L81.4 LENTIGO: ICD-10-CM

## 2022-03-07 DIAGNOSIS — L65.0 TELOGEN EFFLUVIUM: Primary | ICD-10-CM

## 2022-03-07 PROCEDURE — 99213 OFFICE O/P EST LOW 20 MIN: CPT | Performed by: DERMATOLOGY

## 2022-03-07 NOTE — PROGRESS NOTES
Deirdre Jesus is an extremely pleasant 52 year old year old female patient here today for f/u hair loss after COVID.  USing vivscal hair improved.  Today she ntoes spot on nose.   .   Patient states this has been present for a while.  Patient reports the following symptoms:  none.  Patient reports the following previous treatments none.  These treatments did not work.  Patient reports the following modifying factors none.  Associated symptoms: none.  Patient has no other skin complaints today.  Remainder of the HPI, Meds, PMH, Allergies, FH, and SH was reviewed in chart.      Past Medical History:   Diagnosis Date     Breast cancer (H) 12/2020       Past Surgical History:   Procedure Laterality Date     BIOPSY  1/5/01    breast biopsy     BUNIONECTOMY Bilateral      COLONOSCOPY  2018     COLONOSCOPY WITH CO2 INSUFFLATION N/A 10/19/2018    Procedure: colonscopy;  Surgeon: Duane, William Charles, MD;  Location: [facility]     LUMPECTOMY BREAST Left 2/1/2021    Procedure: Left wire localized lumpectomy;  Surgeon: Sung Chiang MD;  Location: [facility] OR     ORTHOPEDIC SURGERY  1982    [facility] surgery        Family History   Problem Relation Age of Onset     Coronary Artery Disease Father 53        MI Fatal      Hyperlipidemia Brother      Hyperlipidemia Brother      Prostate Cancer Brother      Coronary Artery Disease Maternal Grandmother      Coronary Artery Disease Maternal Grandfather      Diabetes No family hx of      Colon Cancer No family hx of      Breast Cancer No family hx of        Social History     Socioeconomic History     Marital status:      Spouse name: Not on file     Number of children: 2     Years of education: Not on file     Highest education level: Not on file   Occupational History     Not on file   Tobacco Use     Smoking status: Never Smoker     Smokeless tobacco: Never Used   Substance and Sexual Activity     Alcohol use: No     Drug use: No     Sexual activity: Yes     Partners: Male     Birth  control/protection: Post-menopausal   Other Topics Concern     Parent/sibling w/ CABG, MI or angioplasty before 65F 55M? Yes     Comment: father   Social History Narrative     Not on file     Social Determinants of Health     Financial Resource Strain: Not on file   Food Insecurity: Not on file   Transportation Needs: Not on file   Physical Activity: Not on file   Stress: Not on file   Social Connections: Not on file   Intimate Partner Violence: Not on file   Housing Stability: Not on file       Outpatient Encounter Medications as of 3/7/2022   Medication Sig Dispense Refill     Cholecalciferol (VITAMIN D PO)        multivitamin, therapeutic with minerals (MULTI-VITAMIN) TABS tablet Take 1 tablet by mouth daily       No facility-administered encounter medications on file as of 3/7/2022.             O:   NAD, WDWN, Alert & Oriented, Mood & Affect wnl, Vitals stable   Here today alone   General appearance normal   Vitals stable   Alert, oriented and in no acute distress      Hair growth noted  L NSW brown macule      Eyes: Conjunctivae/lids:Normal     ENT: Lips, buccal mucosa, tongue: normal    MSK:Normal    Cardiovascular: peripheral edema none    Pulm: Breathing Normal    Neuro/Psych: Orientation:Alert and Orientedx3 ; Mood/Affect:normal       A/P:  1. Telogen effluvium resolved !!  Cont vivscal  2. Lentigo  It was a pleasure speaking to Deirdre Jesus today.  Previous clinic notes and pertinent laboratory tests were reviewed prior to Deirdre Jesus's visit.  BENIGN LESIONS DISCUSSED WITH PATIENT:  I discussed the specifics of tumor, prognosis, and genetics of benign lesions.  I explained that treatment of these lesions would be purely cosmetic and not medically neccessary.  I discussed with patient different removal options including excision, cautery and /or laser.      Patient encouraged to perform monthly skin exams.  UV precautions reviewed with patient.  Return to clinic 12 months

## 2022-03-07 NOTE — LETTER
3/7/2022         RE: Deirdre Jesus  [address] [place] [address]        Dear Colleague,    Thank you for referring your patient, Deirdre Jesus, to the [facility]. Please see a copy of my visit note below.    Deirdre Jesus is an extremely pleasant 52 year old year old female patient here today for f/u hair loss after COVID.  USing vivscal hair improved.  Today she ntoes spot on nose.   .   Patient states this has been present for a while.  Patient reports the following symptoms:  none.  Patient reports the following previous treatments none.  These treatments did not work.  Patient reports the following modifying factors none.  Associated symptoms: none.  Patient has no other skin complaints today.  Remainder of the HPI, Meds, PMH, Allergies, FH, and SH was reviewed in chart.      Past Medical History:   Diagnosis Date     Breast cancer (H) 12/2020       Past Surgical History:   Procedure Laterality Date     BIOPSY  1/5/01    breast biopsy     BUNIONECTOMY Bilateral      COLONOSCOPY  2018     COLONOSCOPY WITH CO2 INSUFFLATION N/A 10/19/2018    Procedure: colonscopy;  Surgeon: Duane, William Charles, MD;  Location: [facility]     LUMPECTOMY BREAST Left 2/1/2021    Procedure: Left wire localized lumpectomy;  Surgeon: Sung Chiang MD;  [facility]     ORTHOPEDIC SURGERY  1982    bunion surgery        Family History   Problem Relation Age of Onset     Coronary Artery Disease Father 53        MI Fatal      Hyperlipidemia Brother      Hyperlipidemia Brother      Prostate Cancer Brother      Coronary Artery Disease Maternal Grandmother      Coronary Artery Disease Maternal Grandfather      Diabetes No family hx of      Colon Cancer No family hx of      Breast Cancer No family hx of        Social History     Socioeconomic History     Marital status:      Spouse name: Not on file     Number of children: 2     Years of education: Not on file     Highest education level: Not on  file   Occupational History     Not on file   Tobacco Use     Smoking status: Never Smoker     Smokeless tobacco: Never Used   Substance and Sexual Activity     Alcohol use: No     Drug use: No     Sexual activity: Yes     Partners: Male     Birth control/protection: Post-menopausal   Other Topics Concern     Parent/sibling w/ CABG, MI or angioplasty before 65F 55M? Yes     Comment: father   Social History Narrative     Not on file     Social Determinants of Health     Financial Resource Strain: Not on file   Food Insecurity: Not on file   Transportation Needs: Not on file   Physical Activity: Not on file   Stress: Not on file   Social Connections: Not on file   Intimate Partner Violence: Not on file   Housing Stability: Not on file       Outpatient Encounter Medications as of 3/7/2022   Medication Sig Dispense Refill     Cholecalciferol (VITAMIN D PO)        multivitamin, therapeutic with minerals (MULTI-VITAMIN) TABS tablet Take 1 tablet by mouth daily       No facility-administered encounter medications on file as of 3/7/2022.             O:   NAD, WDWN, Alert & Oriented, Mood & Affect wnl, Vitals stable   Here today alone   General appearance normal   Vitals stable   Alert, oriented and in no acute distress      Hair growth noted  L NSW brown macule      Eyes: Conjunctivae/lids:Normal     ENT: Lips, buccal mucosa, tongue: normal    MSK:Normal    Cardiovascular: peripheral edema none    Pulm: Breathing Normal    Neuro/Psych: Orientation:Alert and Orientedx3 ; Mood/Affect:normal       A/P:  1. Telogen effluvium resolved !!  Cont vivscal  2. Lentigo  It was a pleasure speaking to Deirdre Jesus today.  Previous clinic notes and pertinent laboratory tests were reviewed prior to Deirdre Jesus's visit.  BENIGN LESIONS DISCUSSED WITH PATIENT:  I discussed the specifics of tumor, prognosis, and genetics of benign lesions.  I explained that treatment of these lesions would be purely cosmetic and not medically  neccessary.  I discussed with patient different removal options including excision, cautery and /or laser.      Patient encouraged to perform monthly skin exams.  UV precautions reviewed with patient.  Return to clinic 12 months        Again, thank you for allowing me to participate in the care of your patient.        Sincerely,        Luciano Smith MD

## 2022-06-13 NOTE — PROGRESS NOTES
FOLLOW UP  Surya 15, 2022     Deirdre Jesus is a 52 year old woman who presents with history of left breast DCIS.      HPI:     She was found on screening mammogram in  to have left breast calcifications. Biopsy demonstrated DCIS grade 2, ER/[place] positive. She underwent a lumpectomy with Dr. Chiang. She elected not to have radiation or endocrine therapy.  She had negative genetic testing in .     Today she denies any breast concerns, mass, skin change, nipple inversion or nipple discharge.      BREAST-SPECIFIC HISTORY:     Previous breast imaging: Yes  - 13 Smammo BI-RADS 1  - 16 Smammo BI-RADS 1  - 3/1/18 Smammo BI-RADS 1  - 19 Smammo BI-RADS 1  - 20 Smammo: left breast calcifications BI-RADS 0  - 20 left Dmammo: calcifications BI-RADS 4  - 21 left breast stereotactic biopsy: DCIS, grade 3, ER positive, [place] positive  - 21 left wire-localized lumpectomy: DCIS 8 mm, grade 2, negative margins  - 12/15/21 Smammo BI-RADS 2     Prior breast biopsies/surgeries: Yes   - 21 left breast stereotactic biopsy: DCIS, grade 3, ER positive, [place] positive  - 21 left wire-localized lumpectomy: DCIS, grade 2, negative margins     Prior history of breast cancer: yes  -  left breast DCIS grade 2, ER positive s/p lumpectomy.   Prior radiation history: No   Self breast exams: yes  Breast density: heterogeneously dense     GYN HISTORY:  . Age at 1st pregnancy: 24. Breastfeeding history: Yes.   Age at menarche:   Menopausal: post menopausal age 49.   Menopausal hormone replacement therapy: No      FAMILY HISTORY:  Breast ca: No  Ovarian ca: No  Pancreatic ca: No  Prostate: Yes   - brother, 65  Gastric ca: No  Melanoma: No  Colon ca: No  Other cancer: No  Other genetic, testing, syndromes, or clotting disorders: no     PAST MEDICAL HISTORY  Past Medical History:   Diagnosis Date     Breast cancer (H) 2020     PAST SURGICAL HISTORY   Past Surgical History:   Procedure Laterality Date      BIOPSY  1/5/01    breast biopsy     BUNIONECTOMY Bilateral      COLONOSCOPY  2018     COLONOSCOPY WITH CO2 INSUFFLATION N/A 10/19/2018    Procedure: colonscopy;  Surgeon: Duane, William Charles, MD;  Location: [facility]     LUMPECTOMY BREAST Left 2/1/2021    Procedure: Left wire localized lumpectomy;  Surgeon: Sung Chiang MD;  Location: [facility]     ORTHOPEDIC SURGERY  1982    bunion surgery     MEDICATIONS  Current Outpatient Medications   Medication Sig Dispense Refill     Cholecalciferol (VITAMIN D PO)        multivitamin, therapeutic with minerals (MULTI-VITAMIN) TABS tablet Take 1 tablet by mouth daily        ALLERGIES   No Known Allergies     SOCIAL HISTORY:  Smokes: No  EtOH: No  Exercise: runs      ROS:  Change in vision No  Headaches: no  Respiratory: No shortness of breath, dyspnea on exertion, cough, or hemoptysis   Cardiovascular: negative   Gastrointestinal: negative Abdominal pain: no  Breast: negative  Musculoskeletal: negative Joint pain No Back pain: no  Psychiatric: negative  Hematologic/Lymphatic/Immunologic: negative  Endocrine: negative    EXAM  /67   Pulse 58   Temp 97.5  F (36.4  C) (Oral)   Wt 66 kg (145 lb 9.6 oz)   SpO2 100%   BMI 22.14 kg/m     PHYSICAL EXAM  Respiratory: breathing non labored.   Breasts: Examination was done in both the upright and supine positions.  Breasts are symmetrical . No dominant fixed or suspicious masses noted. No skin or nipple changes. No nipple discharge. Left breast scar well healed.   No clavicular, cervical, or axillary lymphadenopathy.     ASSESSMENT/PLAN:    Deirdre Jesus is a 52 year old woman with history of left breast ER positive DCIS s/p lumpectomy.      1) Surveillance  History and physical examination every 6-12 months for 5 years, then annually. Mammogram every 12 months.  - Screening mammogram with same day clinic visit due: 12/16/22     2) Lifestyle modifications  - Maintain your best healthy weight. Higher body fat and adult  weight gain is associated with increased risk for breast cancer. This increase in risk has been attributed to increase in circulating endogenous estrogen levels from fat tissue.   - Alcohol can raise estrogen. Alcohol consumption, even at moderate levels (1-2 drinks per day), increases breast cancer risk and are best avoided. If you choose to drink alcohol limit alcohol consumption to less than 1 drink per day. (1 ounce of liquor, 6 ounces of wine, or 8 ounces of beer).  - Be active daily and void being sedentary.     Jesi Escobar PA-C    20 minutes spent on the date of the encounter doing chart review, review of test results, interpretation of tests, patient visit and documentation.

## 2022-06-15 ENCOUNTER — OFFICE VISIT (OUTPATIENT)
Dept: SURGERY | Facility: CLINIC | Age: 52
End: 2022-06-15
Attending: PHYSICIAN ASSISTANT
Payer: COMMERCIAL

## 2022-06-15 VITALS
DIASTOLIC BLOOD PRESSURE: 67 MMHG | SYSTOLIC BLOOD PRESSURE: 103 MMHG | BODY MASS INDEX: 22.14 KG/M2 | HEART RATE: 58 BPM | TEMPERATURE: 97.5 F | WEIGHT: 145.6 LBS | OXYGEN SATURATION: 100 %

## 2022-06-15 DIAGNOSIS — D05.12 DUCTAL CARCINOMA IN SITU (DCIS) OF LEFT BREAST: Primary | ICD-10-CM

## 2022-06-15 PROCEDURE — 99213 OFFICE O/P EST LOW 20 MIN: CPT | Performed by: PHYSICIAN ASSISTANT

## 2022-06-15 PROCEDURE — G0463 HOSPITAL OUTPT CLINIC VISIT: HCPCS

## 2022-06-15 ASSESSMENT — PAIN SCALES - GENERAL: PAINLEVEL: NO PAIN (0)

## 2022-06-15 NOTE — PATIENT INSTRUCTIONS
Deirdre Jesus is a 52 year old woman with history of left breast ER positive DCIS s/p lumpectomy.      1) Surveillance  History and physical examination every 6-12 months for 5 years, then annually. Mammogram every 12 months.  - Screening mammogram with same day clinic visit due: 12/16/22     2) Lfestyle modifications  - Maintain your best healthy weight. Higher body fat and adult weight gain is associated with increased risk for breast cancer. This increase in risk has been attributed to increase in circulating endogenous estrogen levels from fat tissue.   - Alcohol can raise estrogen. Alcohol consumption, even at moderate levels (1-2 drinks per day), increases breast cancer risk and are best avoided. If you choose to drink alcohol limit alcohol consumption to less than 1 drink per day. (1 ounce of liquor, 6 ounces of wine, or 8 ounces of beer).  - Be active daily and void being sedentary.

## 2022-06-15 NOTE — LETTER
6/15/2022         RE: Deirdre Jesus  [id number]  [facility] [address]        Dear Colleague,    Thank you for referring your patient, Deirdre Jesus, to the [facility]. Please see a copy of my visit note below.    FOLLOW UP  Surya 15, 2022     Deirdre Jesus is a 52 year old woman who presents with history of left breast DCIS.      HPI:     She was found on screening mammogram in  to have left breast calcifications. Biopsy demonstrated DCIS grade 2, ER/[place] positive. She underwent a lumpectomy with Dr. Chiang. She elected not to have radiation or endocrine therapy.  She had negative genetic testing in .     Today she denies any breast concerns, mass, skin change, nipple inversion or nipple discharge.      BREAST-SPECIFIC HISTORY:     Previous breast imaging: Yes  - 13 Smammo BI-RADS 1  - 16 Smammo BI-RADS 1  - 3/1/18 Smammo BI-RADS 1  - 19 Smammo BI-RADS 1  - 20 Smammo: left breast calcifications BI-RADS 0  - 20 left Dmammo: calcifications BI-RADS 4  - 21 left breast stereotactic biopsy: DCIS, grade 3, ER positive, [place] positive  - 21 left wire-localized lumpectomy: DCIS 8 mm, grade 2, negative margins  - 12/15/21 Smammo BI-RADS 2     Prior breast biopsies/surgeries: Yes   - 21 left breast stereotactic biopsy: DCIS, grade 3, ER positive, [place] positive  - 21 left wire-localized lumpectomy: DCIS, grade 2, negative margins     Prior history of breast cancer: yes  -  left breast DCIS grade 2, ER positive s/p lumpectomy.   Prior radiation history: No   Self breast exams: yes  Breast density: heterogeneously dense     GYN HISTORY:  . Age at 1st pregnancy: 24. Breastfeeding history: Yes.   Age at menarche:   Menopausal: post menopausal age 49.   Menopausal hormone replacement therapy: No      FAMILY HISTORY:  Breast ca: No  Ovarian ca: No  Pancreatic ca: No  Prostate: Yes   - brother, 65  Gastric ca: No  Melanoma: No  Colon  ca: No  Other cancer: No  Other genetic, testing, syndromes, or clotting disorders: no     PAST MEDICAL HISTORY  Past Medical History:   Diagnosis Date     Breast cancer (H) 12/2020     PAST SURGICAL HISTORY   Past Surgical History:   Procedure Laterality Date     BIOPSY  1/5/01    breast biopsy     BUNIONECTOMY Bilateral      COLONOSCOPY  2018     COLONOSCOPY WITH CO2 INSUFFLATION N/A 10/19/2018    Procedure: colonscopy;  Surgeon: Duane, William Charles, MD;  Location: [facility]     LUMPECTOMY BREAST Left 2/1/2021    Procedure: Left wire localized lumpectomy;  Surgeon: Sung Chiang MD;  Location: [facility]     ORTHOPEDIC SURGERY  1982    bunion surgery     MEDICATIONS  Current Outpatient Medications   Medication Sig Dispense Refill     Cholecalciferol (VITAMIN D PO)        multivitamin, therapeutic with minerals (MULTI-VITAMIN) TABS tablet Take 1 tablet by mouth daily        ALLERGIES   No Known Allergies     SOCIAL HISTORY:  Smokes: No  EtOH: No  Exercise: runs      ROS:  Change in vision No  Headaches: no  Respiratory: No shortness of breath, dyspnea on exertion, cough, or hemoptysis   Cardiovascular: negative   Gastrointestinal: negative Abdominal pain: no  Breast: negative  Musculoskeletal: negative Joint pain No Back pain: no  Psychiatric: negative  Hematologic/Lymphatic/Immunologic: negative  Endocrine: negative    EXAM  /67   Pulse 58   Temp 97.5  F (36.4  C) (Oral)   Wt 66 kg (145 lb 9.6 oz)   SpO2 100%   BMI 22.14 kg/m     PHYSICAL EXAM  Respiratory: breathing non labored.   Breasts: Examination was done in both the upright and supine positions.  Breasts are symmetrical . No dominant fixed or suspicious masses noted. No skin or nipple changes. No nipple discharge. Left breast scar well healed.   No clavicular, cervical, or axillary lymphadenopathy.     ASSESSMENT/PLAN:    Deirdre Jesus is a 52 year old woman with history of left breast ER positive DCIS s/p lumpectomy.      1) Surveillance  History  and physical examination every 6-12 months for 5 years, then annually. Mammogram every 12 months.  - Screening mammogram with same day clinic visit due: 12/16/22     2) Lifestyle modifications  - Maintain your best healthy weight. Higher body fat and adult weight gain is associated with increased risk for breast cancer. This increase in risk has been attributed to increase in circulating endogenous estrogen levels from fat tissue.   - Alcohol can raise estrogen. Alcohol consumption, even at moderate levels (1-2 drinks per day), increases breast cancer risk and are best avoided. If you choose to drink alcohol limit alcohol consumption to less than 1 drink per day. (1 ounce of liquor, 6 ounces of wine, or 8 ounces of beer).  - Be active daily and void being sedentary.     Jesi Escobar PA-C    20 minutes spent on the date of the encounter doing chart review, review of test results, interpretation of tests, patient visit and documentation.

## 2022-06-15 NOTE — NURSING NOTE
"Oncology Rooming Note    Crystal 15, 2022 9:06 AM   Deirdre Jesus is a 52 year old female who presents for:    Chief Complaint   Patient presents with     Oncology Clinic Visit     Ductal carcinoma in situ     Initial Vitals: /67   Pulse 58   Temp 97.5  F (36.4  C) (Oral)   Wt 66 kg (145 lb 9.6 oz)   SpO2 100%   BMI 22.14 kg/m   Estimated body mass index is 22.14 kg/m  as calculated from the following:    Height as of 3/7/22: 1.727 m (5' 8\").    Weight as of this encounter: 66 kg (145 lb 9.6 oz). Body surface area is 1.78 meters squared.  No Pain (0) Comment: Data Unavailable   No LMP recorded. Patient is postmenopausal.  Allergies reviewed: Yes  Medications reviewed: Yes    Medications: Medication refills not needed today.  Pharmacy name entered into [facility]: [facility] [place], [place] - [address] [address]    Clinical concerns: none       Carlene Phillips"

## 2022-07-17 ENCOUNTER — E-VISIT (OUTPATIENT)
Dept: URGENT CARE | Facility: CLINIC | Age: 52
End: 2022-07-17
Payer: COMMERCIAL

## 2022-07-17 DIAGNOSIS — Z20.822 SUSPECTED COVID-19 VIRUS INFECTION: ICD-10-CM

## 2022-07-17 DIAGNOSIS — J02.9 SORE THROAT: ICD-10-CM

## 2022-07-17 PROCEDURE — 99421 OL DIG E/M SVC 5-10 MIN: CPT | Performed by: NURSE PRACTITIONER

## 2022-07-18 ENCOUNTER — LAB (OUTPATIENT)
Dept: URGENT CARE | Facility: URGENT CARE | Age: 52
End: 2022-07-18
Attending: NURSE PRACTITIONER
Payer: COMMERCIAL

## 2022-07-18 DIAGNOSIS — Z20.822 SUSPECTED COVID-19 VIRUS INFECTION: ICD-10-CM

## 2022-07-18 DIAGNOSIS — J02.9 SORE THROAT: ICD-10-CM

## 2022-07-18 LAB
DEPRECATED S PYO AG THROAT QL EIA: NEGATIVE
GROUP A STREP BY PCR: NOT DETECTED

## 2022-07-18 PROCEDURE — 99207 PR NO CHARGE LOS: CPT | Performed by: NURSE PRACTITIONER

## 2022-07-18 PROCEDURE — U0003 INFECTIOUS AGENT DETECTION BY NUCLEIC ACID (DNA OR RNA); SEVERE ACUTE RESPIRATORY SYNDROME CORONAVIRUS 2 (SARS-COV-2) (CORONAVIRUS DISEASE [COVID-19]), AMPLIFIED PROBE TECHNIQUE, MAKING USE OF HIGH THROUGHPUT TECHNOLOGIES AS DESCRIBED BY CMS-2020-01-R: HCPCS | Performed by: NURSE PRACTITIONER

## 2022-07-18 PROCEDURE — 87651 STREP A DNA AMP PROBE: CPT | Performed by: NURSE PRACTITIONER

## 2022-07-18 PROCEDURE — U0005 INFEC AGEN DETEC AMPLI PROBE: HCPCS | Performed by: NURSE PRACTITIONER

## 2022-07-19 LAB — SARS-COV-2 RNA RESP QL NAA+PROBE: NEGATIVE

## 2022-10-15 ENCOUNTER — HEALTH MAINTENANCE LETTER (OUTPATIENT)
Age: 52
End: 2022-10-15

## 2022-12-19 NOTE — PROGRESS NOTES
FOLLOW UP  Dec 21, 2022     Deirdre Jesus is a 52 year old woman who presents with history of left breast DCIS.      HPI:     She was found on screening mammogram in  to have left breast calcifications. Biopsy demonstrated DCIS grade 2, ER/[place] positive. She underwent a lumpectomy with Dr. Chiang. She elected not to have radiation or endocrine therapy.  She had negative genetic testing in .     Today she denies any breast concerns, mass, skin change, nipple inversion or nipple discharge.      BREAST-SPECIFIC HISTORY:     Previous breast imaging: Yes  - 13 Smammo BI-RADS 1  - 16 Smammo BI-RADS 1  - 3/1/18 Smammo BI-RADS 1  - 19 Smammo BI-RADS 1  - 20 Smammo: left breast calcifications BI-RADS 0  - 20 left Dmammo: calcifications BI-RADS 4  - 21 left breast stereotactic biopsy: DCIS, grade 3, ER positive, [place] positive  - 21 left wire-localized lumpectomy: DCIS 8 mm, grade 2, negative margins  - 12/15/21 Smammo BI-RADS 2     Prior breast biopsies/surgeries: Yes   - 21 left breast stereotactic biopsy: DCIS, grade 3, ER positive, [place] positive  - 21 left wire-localized lumpectomy: DCIS, grade 2, negative margins     Prior history of breast cancer: yes  -  left breast DCIS grade 2, ER positive s/p lumpectomy.   Prior radiation history: No   Self breast exams: yes  Breast density: heterogeneously dense     GYN HISTORY:  . Age at 1st pregnancy: 24. Breastfeeding history: Yes.   Age at menarche:   Menopausal: post menopausal age 49.   Menopausal hormone replacement therapy: No      FAMILY HISTORY:  Breast ca: No  Ovarian ca: No  Pancreatic ca: No  Prostate: Yes   - brother, 65  Gastric ca: No  Melanoma: No  Colon ca: No  Other cancer: No  Other genetic, testing, syndromes, or clotting disorders: no     PAST MEDICAL HISTORY  Past Medical History:   Diagnosis Date     Breast cancer (H) 2020     PAST SURGICAL HISTORY   Past Surgical History:   Procedure Laterality Date      BIOPSY  1/5/01    breast biopsy     BUNIONECTOMY Bilateral      COLONOSCOPY  2018     COLONOSCOPY WITH CO2 INSUFFLATION N/A 10/19/2018    Procedure: colonscopy;  Surgeon: Duane, William Charles, MD;  Location: [facility]     LUMPECTOMY BREAST Left 2/1/2021    Procedure: Left wire localized lumpectomy;  Surgeon: Sung Chiang MD;  Location: [facility]     ORTHOPEDIC SURGERY  1982    bunion surgery     MEDICATIONS  Current Outpatient Medications   Medication Sig Dispense Refill     Cholecalciferol (VITAMIN D PO)        multivitamin w/minerals (THERA-VIT-M) tablet Take 1 tablet by mouth daily        ALLERGIES   No Known Allergies     SOCIAL HISTORY:  Smokes: No  EtOH: No  Exercise: runs    She has 2 children in their 20's. Her daughter lives local and works in [employer], her son is at home and finishing law school.     ROS:  Change in vision No  Headaches: no  Respiratory: No shortness of breath, dyspnea on exertion, cough, or hemoptysis   Cardiovascular: negative   Gastrointestinal: negative Abdominal pain: no  Breast: negative  Musculoskeletal: negative Joint pain No Back pain: no  Psychiatric: negative  Hematologic/Lymphatic/Immunologic: negative  Endocrine: negative    EXAM  /75   Pulse 69   Temp 97.5  F (36.4  C) (Oral)   Resp 16   Wt 66.2 kg (146 lb)   SpO2 98%   BMI 22.20 kg/m     PHYSICAL EXAM  Respiratory: breathing non labored.   Breasts: Examination was done in both the upright and supine positions.  Breasts are symmetrical . No dominant fixed or suspicious masses noted. No skin or nipple changes. No nipple discharge.   Left breast scar well healed.   No clavicular, cervical, or axillary lymphadenopathy.     INVESTIGATIONS:   12/21/22 screening mammogram: per Radiology no concerning findings, final report pending.     ASSESSMENT/PLAN:    Deirdre Jesus is a 52 year old woman with history of left breast ER positive DCIS s/p lumpectomy.      1) Surveillance  History and physical examination every  6-12 months for 5 years, then annually. Mammogram every 12 months.  - Screening mammogram with same day clinic visit due: 12/22/23     2) Lifestyle modifications  - Maintain your best healthy weight. Higher body fat and adult weight gain is associated with increased risk for breast cancer. This increase in risk has been attributed to increase in circulating endogenous estrogen levels from fat tissue.   - Alcohol can raise estrogen. Alcohol consumption, even at moderate levels (1-2 drinks per day), increases breast cancer risk and are best avoided. If you choose to drink alcohol limit alcohol consumption to less than 1 drink per day. (1 ounce of liquor, 6 ounces of wine, or 8 ounces of beer).  - Be active daily and void being sedentary.     Jesi Escobar PA-C    20 minutes spent on the date of the encounter doing chart review, review of test results, interpretation of tests, patient visit and documentation.

## 2022-12-21 ENCOUNTER — OFFICE VISIT (OUTPATIENT)
Dept: SURGERY | Facility: CLINIC | Age: 52
End: 2022-12-21
Attending: PHYSICIAN ASSISTANT
Payer: COMMERCIAL

## 2022-12-21 ENCOUNTER — ANCILLARY PROCEDURE (OUTPATIENT)
Dept: MAMMOGRAPHY | Facility: CLINIC | Age: 52
End: 2022-12-21
Payer: COMMERCIAL

## 2022-12-21 VITALS
HEART RATE: 69 BPM | TEMPERATURE: 97.5 F | DIASTOLIC BLOOD PRESSURE: 75 MMHG | BODY MASS INDEX: 22.2 KG/M2 | OXYGEN SATURATION: 98 % | WEIGHT: 146 LBS | RESPIRATION RATE: 16 BRPM | SYSTOLIC BLOOD PRESSURE: 112 MMHG

## 2022-12-21 DIAGNOSIS — Z12.31 VISIT FOR SCREENING MAMMOGRAM: ICD-10-CM

## 2022-12-21 DIAGNOSIS — D05.12 DUCTAL CARCINOMA IN SITU (DCIS) OF LEFT BREAST: Primary | ICD-10-CM

## 2022-12-21 PROCEDURE — 77063 BREAST TOMOSYNTHESIS BI: CPT | Mod: GC | Performed by: STUDENT IN AN ORGANIZED HEALTH CARE EDUCATION/TRAINING PROGRAM

## 2022-12-21 PROCEDURE — 99213 OFFICE O/P EST LOW 20 MIN: CPT | Performed by: PHYSICIAN ASSISTANT

## 2022-12-21 PROCEDURE — 99211 OFF/OP EST MAY X REQ PHY/QHP: CPT | Performed by: PHYSICIAN ASSISTANT

## 2022-12-21 PROCEDURE — 77067 SCR MAMMO BI INCL CAD: CPT | Mod: GC | Performed by: STUDENT IN AN ORGANIZED HEALTH CARE EDUCATION/TRAINING PROGRAM

## 2022-12-21 PROCEDURE — G0463 HOSPITAL OUTPT CLINIC VISIT: HCPCS

## 2022-12-21 ASSESSMENT — PAIN SCALES - GENERAL: PAINLEVEL: NO PAIN (0)

## 2022-12-21 NOTE — PATIENT INSTRUCTIONS
Deirdre Jesus is a 52 year old woman with history of left breast ER positive DCIS s/p lumpectomy.      1) Surveillance  History and physical examination every 6-12 months for 5 years, then annually. Mammogram every 12 months.  - Screening mammogram with same day clinic visit due: 12/22/23     2) Lifestyle modifications  - Maintain your best healthy weight. Higher body fat and adult weight gain is associated with increased risk for breast cancer. This increase in risk has been attributed to increase in circulating endogenous estrogen levels from fat tissue.   - Alcohol can raise estrogen. Alcohol consumption, even at moderate levels (1-2 drinks per day), increases breast cancer risk and are best avoided. If you choose to drink alcohol limit alcohol consumption to less than 1 drink per day. (1 ounce of liquor, 6 ounces of wine, or 8 ounces of beer).  - Be active daily and void being sedentary.

## 2022-12-21 NOTE — LETTER
2022         RE: Deirdre Jesus  [id number]  [facility] [address]        Dear Colleague,    Thank you for referring your patient, Deirdre Jesus, to the [facility]. Please see a copy of my visit note below.    FOLLOW UP  Dec 21, 2022     Deirdre Jesus is a 52 year old woman who presents with history of left breast DCIS.      HPI:     She was found on screening mammogram in  to have left breast calcifications. Biopsy demonstrated DCIS grade 2, ER/[place] positive. She underwent a lumpectomy with Dr. Chiang. She elected not to have radiation or endocrine therapy.  She had negative genetic testing in .     Today she denies any breast concerns, mass, skin change, nipple inversion or nipple discharge.      BREAST-SPECIFIC HISTORY:     Previous breast imaging: Yes  - 13 Smammo BI-RADS 1  - 16 Smammo BI-RADS 1  - 3/1/18 Smammo BI-RADS 1  - 19 Smammo BI-RADS 1  - 20 Smammo: left breast calcifications BI-RADS 0  - 20 left Dmammo: calcifications BI-RADS 4  - 21 left breast stereotactic biopsy: DCIS, grade 3, ER positive, [place] positive  - 21 left wire-localized lumpectomy: DCIS 8 mm, grade 2, negative margins  - 12/15/21 Smammo BI-RADS 2     Prior breast biopsies/surgeries: Yes   - 21 left breast stereotactic biopsy: DCIS, grade 3, ER positive, [place] positive  - 21 left wire-localized lumpectomy: DCIS, grade 2, negative margins     Prior history of breast cancer: yes  -  left breast DCIS grade 2, ER positive s/p lumpectomy.   Prior radiation history: No   Self breast exams: yes  Breast density: heterogeneously dense     GYN HISTORY:  . Age at 1st pregnancy: 24. Breastfeeding history: Yes.   Age at menarche:   Menopausal: post menopausal age 49.   Menopausal hormone replacement therapy: No      FAMILY HISTORY:  Breast ca: No  Ovarian ca: No  Pancreatic ca: No  Prostate: Yes   - brother, 65  Gastric ca: No  Melanoma: No  Colon  ca: No  Other cancer: No  Other genetic, testing, syndromes, or clotting disorders: no     PAST MEDICAL HISTORY  Past Medical History:   Diagnosis Date     Breast cancer (H) 12/2020     PAST SURGICAL HISTORY   Past Surgical History:   Procedure Laterality Date     BIOPSY  1/5/01    breast biopsy     BUNIONECTOMY Bilateral      COLONOSCOPY  2018     COLONOSCOPY WITH CO2 INSUFFLATION N/A 10/19/2018    Procedure: colonscopy;  Surgeon: Duane, William Charles, MD;  Location: [facility]     LUMPECTOMY BREAST Left 2/1/2021    Procedure: Left wire localized lumpectomy;  Surgeon: Sung Chiang MD;  Location: [facility]     ORTHOPEDIC SURGERY  1982    bunion surgery     MEDICATIONS  Current Outpatient Medications   Medication Sig Dispense Refill     Cholecalciferol (VITAMIN D PO)        multivitamin w/minerals (THERA-VIT-M) tablet Take 1 tablet by mouth daily        ALLERGIES   No Known Allergies     SOCIAL HISTORY:  Smokes: No  EtOH: No  Exercise: runs    She has 2 children in their 20's. Her daughter lives local and works in [employer], her son is at home and finishing law school.     ROS:  Change in vision No  Headaches: no  Respiratory: No shortness of breath, dyspnea on exertion, cough, or hemoptysis   Cardiovascular: negative   Gastrointestinal: negative Abdominal pain: no  Breast: negative  Musculoskeletal: negative Joint pain No Back pain: no  Psychiatric: negative  Hematologic/Lymphatic/Immunologic: negative  Endocrine: negative    EXAM  /75   Pulse 69   Temp 97.5  F (36.4  C) (Oral)   Resp 16   Wt 66.2 kg (146 lb)   SpO2 98%   BMI 22.20 kg/m     PHYSICAL EXAM  Respiratory: breathing non labored.   Breasts: Examination was done in both the upright and supine positions.  Breasts are symmetrical . No dominant fixed or suspicious masses noted. No skin or nipple changes. No nipple discharge.   Left breast scar well healed.   No clavicular, cervical, or axillary lymphadenopathy.     INVESTIGATIONS:   12/21/22  screening mammogram: per Radiology no concerning findings, final report pending.     ASSESSMENT/PLAN:    Deirdre Jesus is a 52 year old woman with history of left breast ER positive DCIS s/p lumpectomy.      1) Surveillance  History and physical examination every 6-12 months for 5 years, then annually. Mammogram every 12 months.  - Screening mammogram with same day clinic visit due: 12/22/23     2) Lifestyle modifications  - Maintain your best healthy weight. Higher body fat and adult weight gain is associated with increased risk for breast cancer. This increase in risk has been attributed to increase in circulating endogenous estrogen levels from fat tissue.   - Alcohol can raise estrogen. Alcohol consumption, even at moderate levels (1-2 drinks per day), increases breast cancer risk and are best avoided. If you choose to drink alcohol limit alcohol consumption to less than 1 drink per day. (1 ounce of liquor, 6 ounces of wine, or 8 ounces of beer).  - Be active daily and void being sedentary.     Jesi Escobar PA-C    20 minutes spent on the date of the encounter doing chart review, review of test results, interpretation of tests, patient visit and documentation.

## 2022-12-21 NOTE — NURSING NOTE
"Oncology Rooming Note    December 21, 2022 12:00 PM   Deirdre Jesus is a 52 year old female who presents for:    Chief Complaint   Patient presents with     Oncology Clinic Visit     DCIS of left breast     Initial Vitals: /75   Pulse 69   Temp 97.5  F (36.4  C) (Oral)   Resp 16   Wt 66.2 kg (146 lb)   SpO2 98%   BMI 22.20 kg/m   Estimated body mass index is 22.2 kg/m  as calculated from the following:    Height as of 3/7/22: 1.727 m (5' 8\").    Weight as of this encounter: 66.2 kg (146 lb). Body surface area is 1.78 meters squared.  No Pain (0) Comment: Data Unavailable   No LMP recorded. Patient is postmenopausal.  Allergies reviewed: Yes  Medications reviewed: Yes    Medications: Medication refills not needed today.  Pharmacy name entered into [facility]: [id number] [address] [address]    Clinical concerns: none       Francy Momin CMA            "

## 2023-03-06 NOTE — TELEPHONE ENCOUNTER
Appointment at 10, lab and xray before hand. Pt notified.    Left message regarding appt time change on 11/7/23 from 8:45am to 8:30 am. Letter sent.

## 2023-04-03 ASSESSMENT — ENCOUNTER SYMPTOMS
JOINT SWELLING: 0
DYSURIA: 0
HEMATOCHEZIA: 0
SORE THROAT: 0
WEAKNESS: 0
DIARRHEA: 0
PARESTHESIAS: 0
HEMATURIA: 0
MYALGIAS: 0
FREQUENCY: 0
PALPITATIONS: 0
DIZZINESS: 0
EYE PAIN: 0
HEARTBURN: 0
SHORTNESS OF BREATH: 0
NERVOUS/ANXIOUS: 0
NAUSEA: 0
ABDOMINAL PAIN: 0
COUGH: 0
ARTHRALGIAS: 0
BREAST MASS: 0
FEVER: 0
CHILLS: 0
HEADACHES: 0
CONSTIPATION: 0

## 2023-04-04 ENCOUNTER — OFFICE VISIT (OUTPATIENT)
Dept: FAMILY MEDICINE | Facility: CLINIC | Age: 53
End: 2023-04-04
Payer: COMMERCIAL

## 2023-04-04 VITALS
RESPIRATION RATE: 20 BRPM | TEMPERATURE: 97.6 F | DIASTOLIC BLOOD PRESSURE: 74 MMHG | BODY MASS INDEX: 22.43 KG/M2 | SYSTOLIC BLOOD PRESSURE: 126 MMHG | HEIGHT: 68 IN | OXYGEN SATURATION: 100 % | WEIGHT: 148 LBS | HEART RATE: 69 BPM

## 2023-04-04 DIAGNOSIS — D05.12 DUCTAL CARCINOMA IN SITU (DCIS) OF LEFT BREAST: ICD-10-CM

## 2023-04-04 DIAGNOSIS — Z13.1 SCREENING FOR DIABETES MELLITUS: ICD-10-CM

## 2023-04-04 DIAGNOSIS — Z00.00 ANNUAL PHYSICAL EXAM: Primary | ICD-10-CM

## 2023-04-04 DIAGNOSIS — Z13.220 NEED FOR LIPID SCREENING: ICD-10-CM

## 2023-04-04 DIAGNOSIS — E83.110 FAMILIAL HEMOCHROMATOSIS (H): ICD-10-CM

## 2023-04-04 LAB
ALBUMIN SERPL-MCNC: 4.2 G/DL (ref 3.4–5)
ALP SERPL-CCNC: 56 U/L (ref 40–150)
ALT SERPL W P-5'-P-CCNC: 33 U/L (ref 0–50)
ANION GAP SERPL CALCULATED.3IONS-SCNC: 5 MMOL/L (ref 3–14)
AST SERPL W P-5'-P-CCNC: 23 U/L (ref 0–45)
BILIRUB SERPL-MCNC: 0.7 MG/DL (ref 0.2–1.3)
BUN SERPL-MCNC: 11 MG/DL (ref 7–30)
CALCIUM SERPL-MCNC: 10 MG/DL (ref 8.5–10.1)
CHLORIDE BLD-SCNC: 106 MMOL/L (ref 94–109)
CHOLEST SERPL-MCNC: 291 MG/DL
CO2 SERPL-SCNC: 28 MMOL/L (ref 20–32)
CREAT SERPL-MCNC: 0.74 MG/DL (ref 0.52–1.04)
ERYTHROCYTE [DISTWIDTH] IN BLOOD BY AUTOMATED COUNT: 12.7 % (ref 10–15)
FASTING STATUS PATIENT QL REPORTED: NO
FERRITIN SERPL-MCNC: 171 NG/ML (ref 8–252)
GFR SERPL CREATININE-BSD FRML MDRD: >90 ML/MIN/1.73M2
GLUCOSE BLD-MCNC: 85 MG/DL (ref 70–99)
HCT VFR BLD AUTO: 40.7 % (ref 35–47)
HDLC SERPL-MCNC: 99 MG/DL
HGB BLD-MCNC: 13.3 G/DL (ref 11.7–15.7)
IRON SATN MFR SERPL: 36 % (ref 15–46)
IRON SERPL-MCNC: 104 UG/DL (ref 35–180)
LDLC SERPL CALC-MCNC: 183 MG/DL
MCH RBC QN AUTO: 29.2 PG (ref 26.5–33)
MCHC RBC AUTO-ENTMCNC: 32.7 G/DL (ref 31.5–36.5)
MCV RBC AUTO: 90 FL (ref 78–100)
NONHDLC SERPL-MCNC: 192 MG/DL
PLATELET # BLD AUTO: 253 10E3/UL (ref 150–450)
POTASSIUM BLD-SCNC: 3.8 MMOL/L (ref 3.4–5.3)
PROT SERPL-MCNC: 7.6 G/DL (ref 6.8–8.8)
RBC # BLD AUTO: 4.55 10E6/UL (ref 3.8–5.2)
SODIUM SERPL-SCNC: 139 MMOL/L (ref 133–144)
TIBC SERPL-MCNC: 292 UG/DL (ref 240–430)
TRIGL SERPL-MCNC: 47 MG/DL
WBC # BLD AUTO: 6.5 10E3/UL (ref 4–11)

## 2023-04-04 PROCEDURE — 36415 COLL VENOUS BLD VENIPUNCTURE: CPT | Performed by: INTERNAL MEDICINE

## 2023-04-04 PROCEDURE — 80061 LIPID PANEL: CPT | Performed by: INTERNAL MEDICINE

## 2023-04-04 PROCEDURE — 83540 ASSAY OF IRON: CPT | Performed by: INTERNAL MEDICINE

## 2023-04-04 PROCEDURE — 99213 OFFICE O/P EST LOW 20 MIN: CPT | Mod: 25 | Performed by: INTERNAL MEDICINE

## 2023-04-04 PROCEDURE — 85027 COMPLETE CBC AUTOMATED: CPT | Performed by: INTERNAL MEDICINE

## 2023-04-04 PROCEDURE — 82728 ASSAY OF FERRITIN: CPT | Performed by: INTERNAL MEDICINE

## 2023-04-04 PROCEDURE — 83550 IRON BINDING TEST: CPT | Performed by: INTERNAL MEDICINE

## 2023-04-04 PROCEDURE — 99396 PREV VISIT EST AGE 40-64: CPT | Performed by: INTERNAL MEDICINE

## 2023-04-04 PROCEDURE — 80053 COMPREHEN METABOLIC PANEL: CPT | Performed by: INTERNAL MEDICINE

## 2023-04-04 ASSESSMENT — ENCOUNTER SYMPTOMS
NERVOUS/ANXIOUS: 0
DIARRHEA: 0
SHORTNESS OF BREATH: 0
PALPITATIONS: 0
FEVER: 0
HEMATURIA: 0
ARTHRALGIAS: 0
NAUSEA: 0
CONSTIPATION: 0
HEADACHES: 0
HEARTBURN: 0
SORE THROAT: 0
FREQUENCY: 0
HEMATOCHEZIA: 0
COUGH: 0
DIZZINESS: 0
JOINT SWELLING: 0
BREAST MASS: 0
CHILLS: 0
PARESTHESIAS: 0
MYALGIAS: 0
DYSURIA: 0
ABDOMINAL PAIN: 0
WEAKNESS: 0
EYE PAIN: 0

## 2023-04-04 ASSESSMENT — PAIN SCALES - GENERAL: PAINLEVEL: NO PAIN (0)

## 2023-04-04 NOTE — PROGRESS NOTES
SUBJECTIVE:   CC: Deirdre is an 53 year old who presents for preventive health visit.     53-year-old comes in for an annual checkup and to establish care.  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  ER/VT positive.  Elected not to consider radiation therapy or hormonal therapy.  Patient is postmenopausal.  She offers no concerns or complaint.  She exercises regularly.  She does not use any alcohol.  Non-smoker.  BMI is 22.5.  She gets her pelvic examination by her gynecologist.        4/4/2023     1:55 PM   Additional Questions   Roomed by Rebecca GUZMAN   Accompanied by self     Patient has been advised of split billing requirements and indicates understanding: Yes  Healthy Habits:     Getting at least 3 servings of Calcium per day:  Yes    Bi-annual eye exam:  Yes    Dental care twice a year:  Yes    Sleep apnea or symptoms of sleep apnea:  None    Diet:  Regular (no restrictions)    Frequency of exercise:  4-5 days/week    Duration of exercise:  30-45 minutes    Taking medications regularly:  Yes    Medication side effects:  Not applicable    PHQ-2 Total Score: 0    Additional concerns today:  No        Today's PHQ-2 Score:       4/3/2023     6:37 PM   PHQ-2 ( 1999 [employer])   Q1: Little interest or pleasure in doing things 0   Q2: Feeling down, depressed or hopeless 0   PHQ-2 Score 0   Q1: Little interest or pleasure in doing things Not at all    Not at all   Q2: Feeling down, depressed or hopeless Not at all    Not at all   PHQ-2 Score 0    0           Social History     Tobacco Use     Smoking status: Never     Smokeless tobacco: Never   Vaping Use     Vaping status: Not on file   Substance Use Topics     Alcohol use: No             4/3/2023     6:37 PM   Alcohol Use   Prescreen: >3 drinks/day or >7 drinks/week? Not Applicable     Reviewed orders with patient.  Reviewed health maintenance and updated orders accordingly - Yes  BP Readings from Last 3  Encounters:   04/04/23 126/74   12/21/22 112/75   06/15/22 103/67    Wt Readings from Last 3 Encounters:   04/04/23 67.1 kg (148 lb)   12/21/22 66.2 kg (146 lb)   06/15/22 66 kg (145 lb 9.6 oz)                  Patient Active Problem List   Diagnosis     Cyst of left ovary     Familial hemochromatosis (H)     Intramural leiomyoma of uterus     Hyperlipidemia LDL goal <160     Ductal carcinoma in situ (DCIS) of left breast     Infection due to 2019 novel coronavirus     Past Surgical History:   Procedure Laterality Date     BIOPSY  1/5/01    breast biopsy     BUNIONECTOMY Bilateral      COLONOSCOPY  2018     COLONOSCOPY WITH CO2 INSUFFLATION N/A 10/19/2018    Procedure: colonscopy;  Surgeon: Duane, William Charles, MD;  Location: [facility]     LUMPECTOMY BREAST Left 2/1/2021    Procedure: Left wire localized lumpectomy;  Surgeon: Sung Chiang MD;  Location: [facility] OR     ORTHOPEDIC SURGERY  1982    bunion surgery       Social History     Tobacco Use     Smoking status: Never     Smokeless tobacco: Never   Vaping Use     Vaping status: Never Used     Passive vaping exposure: Yes   Substance Use Topics     Alcohol use: No     Family History   Problem Relation Age of Onset     Heart Failure Mother      Pacemaker Mother      Coronary Artery Disease Father 53        MI Fatal      Hyperlipidemia Brother      Hyperlipidemia Brother      Prostate Cancer Brother      Hemochromatosis Brother      Coronary Artery Disease Maternal Grandmother      Coronary Artery Disease Maternal Grandfather      Diabetes No family hx of      Colon Cancer No family hx of      Breast Cancer No family hx of          Current Outpatient Medications   Medication Sig Dispense Refill     Cholecalciferol (VITAMIN D PO)        multivitamin w/minerals (THERA-VIT-M) tablet Take 1 tablet by mouth daily       No Known Allergies    Breast Cancer Screening:    FHS-7:       12/15/2021    12:39 PM 12/21/2022    11:43 AM   Breast CA Risk Assessment (FHS-7)   Did any  of your first-degree relatives have breast or ovarian cancer? No No   Did any of your relatives have bilateral breast cancer? No No   Did any man in your family have breast cancer? No No   Did any woman in your family have breast and ovarian cancer? No No   Did any woman in your family have breast cancer before age 50 y? No No   Do you have 2 or more relatives with breast and/or ovarian cancer? No No   Do you have 2 or more relatives with breast and/or bowel cancer? No No     click delete button to remove this line now  Mammogram Screening: Recommended mammography every 1-2 years with patient discussion and risk factor consideration  Pertinent mammograms are reviewed under the imaging tab.    History of abnormal Pap smear: NO - age 30-65 PAP every 5 years with negative HPV co-testing recommended     Reviewed and updated as needed this visit by clinical staff   Tobacco  Allergies  Meds              Reviewed and updated as needed this visit by Provider                 Past Medical History:   Diagnosis Date     Breast cancer (H) 12/2020    DCIS left breast treated with lumpectomy. ER/CT +      Past Surgical History:   Procedure Laterality Date     BIOPSY  1/5/01    breast biopsy     BUNIONECTOMY Bilateral      COLONOSCOPY  2018     COLONOSCOPY WITH CO2 INSUFFLATION N/A 10/19/2018    Procedure: colonscopy;  Surgeon: Duane, William Charles, MD;  Location: [facility] OR     LUMPECTOMY BREAST Left 2/1/2021    Procedure: Left wire localized lumpectomy;  Surgeon: Sung Chiang MD;  Location:  OR     ORTHOPEDIC SURGERY  1982    [facility] surgery       Review of Systems   Constitutional: Negative for chills and fever.   HENT: Negative for congestion, ear pain, hearing loss and sore throat.    Eyes: Negative for pain and visual disturbance.   Respiratory: Negative for cough and shortness of breath.    Cardiovascular: Negative for chest pain, palpitations and peripheral edema.   Gastrointestinal: Negative for abdominal pain,  "constipation, diarrhea, heartburn, hematochezia and nausea.   Breasts:  Negative for tenderness, breast mass and discharge.   Genitourinary: Negative for dysuria, frequency, genital sores, hematuria, pelvic pain, urgency, vaginal bleeding and vaginal discharge.   Musculoskeletal: Negative for arthralgias, joint swelling and myalgias.   Skin: Negative for rash.   Neurological: Negative for dizziness, weakness, headaches and paresthesias.   Psychiatric/Behavioral: Negative for mood changes. The patient is not nervous/anxious.      CONSTITUTIONAL: NEGATIVE for fever, chills, change in weight  INTEGUMENTARY/SKIN: NEGATIVE for worrisome rashes, moles or lesions  EYES: NEGATIVE for vision changes or irritation  ENT: NEGATIVE for ear, mouth and throat problems  RESP: NEGATIVE for significant cough or SOB  CV: NEGATIVE for chest pain, palpitations or peripheral edema  GI: NEGATIVE for nausea, abdominal pain, heartburn, or change in bowel habits  : NEGATIVE for unusual urinary or vaginal symptoms. No vaginal bleeding.  MUSCULOSKELETAL: NEGATIVE for significant arthralgias or myalgia  NEURO: NEGATIVE for weakness, dizziness or paresthesias  ENDOCRINE: NEGATIVE for temperature intolerance, skin/hair changes  PSYCHIATRIC: NEGATIVE for changes in mood or affect      OBJECTIVE:   /74 (BP Location: Right arm, Patient Position: Sitting, Cuff Size: Adult Regular)   Pulse 69   Temp 97.6  F (36.4  C) (Tympanic)   Resp 20   Ht 1.727 m (5' 8\")   Wt 67.1 kg (148 lb)   SpO2 100%   BMI 22.50 kg/m    Physical Exam  GENERAL APPEARANCE: healthy, alert and no distress  EYES: Eyes grossly normal to inspection, PERRL and conjunctivae and sclerae normal  HENT: ear canals and TM's normal, nose and mouth without ulcers or lesions, oropharynx clear and oral mucous membranes moist  NECK: no adenopathy, no asymmetry, masses, or scars and thyroid normal to palpation  RESP: lungs clear to auscultation - no rales, rhonchi or wheezes  CV: " regular rate and rhythm, normal S1 S2, no S3 or S4, no murmur, click or rub, no peripheral edema and peripheral pulses strong  ABDOMEN: soft, nontender, no hepatosplenomegaly, no masses and bowel sounds normal  MS: no musculoskeletal defects are noted and gait is age appropriate without ataxia  SKIN: no suspicious lesions or rashes  NEURO: Normal strength and tone, sensory exam grossly normal, mentation intact and speech normal  PSYCH: mentation appears normal and affect normal/bright        ASSESSMENT/PLAN:     1.  Annual physical exam completed.  She gets breast exam from the breast surgeon and pelvic exams through gynecology.  2.  Ductal carcinoma in the side-year-old for left breast.  8 mm grade 2 lesion treated with lumpectomy.  Grade 1A.  ER/WA positive.  Patient elected not to do postlumpectomy radiation or hormonal therapy.  3.  Familial hemochromatosis with a brother having hemochromatosis.  She had a higher ferritin level couple of years ago so I will check iron studies as well as ferritin level and CBC.  I will get back to the results.  4.  Screening for diabetes and lipid disorder.  I will get back to the results of the lab.  5.  Patient gets annual mammogram is recommended and breast exam.  6.  Normal colonoscopy 10/19/2018.      Patient should return on an annual basis.    Patient has been advised of split billing requirements and indicates understanding: Yes      COUNSELING:  Reviewed preventive health counseling, as reflected in patient instructions       Healthy diet/nutrition       Vision screening        She reports that she has never smoked. She has never used smokeless tobacco.      Rios Linda MD  [facility]

## 2023-11-21 ENCOUNTER — PATIENT OUTREACH (OUTPATIENT)
Dept: CARE COORDINATION | Facility: CLINIC | Age: 53
End: 2023-11-21
Payer: COMMERCIAL

## 2023-12-12 NOTE — PROGRESS NOTES
FOLLOW UP  Dec 14, 2023     Deirdre Jesus is a 53 year old woman who presents with history of left breast ductal carcinoma in situ.      HPI:     She was found on screening mammogram in  to have left breast calcifications. Biopsy demonstrated DCIS grade 2, ER/PA positive. She underwent a lumpectomy with Dr. Chiang. She elected not to have radiation or endocrine therapy.  She had negative genetic testing in .     Today she denies any breast concerns, mass, skin change, nipple inversion or nipple discharge.      BREAST-SPECIFIC HISTORY:     Previous breast imaging: Yes  - 13 Smammo BI-RADS 1  - 16 Smammo BI-RADS 1  - 3/1/18 Smammo BI-RADS 1  - 19 Smammo BI-RADS 1  - 20 Smammo: left breast calcifications BI-RADS 0  - 20 left Dmammo: calcifications BI-RADS 4  - 21 left breast stereotactic biopsy: DCIS, grade 3, ER positive, PA positive  - 21 left wire-localized lumpectomy: DCIS 8 mm, grade 2, negative margins  - 12/15/21 Smammo BI-RADS 2  - 22 Smammo BI-RADS 1     Prior breast biopsies/surgeries: Yes   - 21 left breast stereotactic biopsy: DCIS, grade 3, ER positive, PA positive  - 21 left wire-localized lumpectomy: DCIS, grade 2, negative margins     Prior history of breast cancer: yes  -  left breast DCIS grade 2, ER positive s/p lumpectomy.   Prior radiation history: No   Self breast exams: yes  Breast density: heterogeneously dense     GYN HISTORY:  . Age at 1st pregnancy: 24. Breastfeeding history: Yes.   Age at menarche:   Menopausal: post menopausal age 49.   Menopausal hormone replacement therapy: No      FAMILY HISTORY:  Breast ca: No  Ovarian ca: No  Pancreatic ca: No  Prostate: Yes   - brother, 65  Gastric ca: No  Melanoma: No  Colon ca: No  Other cancer: No  Other genetic, testing, syndromes, or clotting disorders: no     PAST MEDICAL HISTORY  Past Medical History:   Diagnosis Date    Breast cancer (H) 2020    DCIS left breast treated  with lumpectomy. ER/NH +     PAST SURGICAL HISTORY   Past Surgical History:   Procedure Laterality Date    BIOPSY  1/5/01    breast biopsy    BUNIONECTOMY Bilateral     COLONOSCOPY  2018    COLONOSCOPY WITH CO2 INSUFFLATION N/A 10/19/2018    Procedure: colonscopy;  Surgeon: Duane, William Charles, MD;  Location: [facility] OR    LUMPECTOMY BREAST Left 2/1/2021    Procedure: Left wire localized lumpectomy;  Surgeon: Sung Chiang MD;  Location:  OR    ORTHOPEDIC SURGERY  1982    [facility] surgery     MEDICATIONS  Current Outpatient Medications   Medication Sig Dispense Refill    Cholecalciferol (VITAMIN D PO)       multivitamin w/minerals (THERA-VIT-M) tablet Take 1 tablet by mouth daily        ALLERGIES   No Known Allergies     SOCIAL HISTORY:  Smokes: No  EtOH: No  Exercise: runs    She has 2 children in their 20's. Her daughter lives local and works in [employer], her son is at home and finishing law school.     ROS:  Change in vision No  Headaches: no  Respiratory: No shortness of breath, dyspnea on exertion, cough, or hemoptysis   Cardiovascular: negative   Gastrointestinal: negative Abdominal pain: no  Breast: negative  Musculoskeletal: negative Joint pain No Back pain: no  Psychiatric: negative  Hematologic/Lymphatic/Immunologic: negative  Endocrine: negative    EXAM  /80 (BP Location: Right arm, Patient Position: Sitting, Cuff Size: Adult Regular)   Pulse 64   Temp 97.7  F (36.5  C) (Oral)   Resp 16   Wt 68.7 kg (151 lb 8 oz)   SpO2 99%   BMI 23.04 kg/m     PHYSICAL EXAM  Respiratory: breathing non labored.   Breasts: Examination was done in both the upright and supine positions.  Breasts are symmetrical . No dominant fixed or suspicious masses noted. No skin or nipple changes. No nipple discharge.   Left breast scar well healed.   No clavicular, cervical, or axillary lymphadenopathy.     INVESTIGATIONS:   12/14/23 screening mammogram: per Radiology no concerning findings, final report pending.      ASSESSMENT/PLAN:    Deirdre Jesus is a 53 year old woman with history of left breast estrogen receptor positive ductal carcinoma in situ s/p lumpectomy.      1) Surveillance  History and physical examination every 6-12 months for 5 years, then annually. Mammogram every 12 months.  - Screening mammogram with same day clinic visit due: 12/15/24     2) Lifestyle modifications  - Maintain your best healthy weight. Higher body fat and adult weight gain is associated with increased risk for breast cancer. This increase in risk has been attributed to increase in circulating endogenous estrogen levels from fat tissue.   - Alcohol can raise estrogen. Alcohol consumption, even at moderate levels (1-2 drinks per day), increases breast cancer risk and are best avoided. If you choose to drink alcohol limit alcohol consumption to less than 1 drink per day. (1 ounce of liquor, 6 ounces of wine, or 8 ounces of beer).  - Be active daily and void being sedentary.     Jesi Escobar PA-C    20 minutes spent on the date of the encounter doing chart review, review of test results, interpretation of tests, patient visit and documentation.

## 2023-12-14 ENCOUNTER — ANCILLARY PROCEDURE (OUTPATIENT)
Dept: MAMMOGRAPHY | Facility: CLINIC | Age: 53
End: 2023-12-14
Attending: PHYSICIAN ASSISTANT
Payer: COMMERCIAL

## 2023-12-14 ENCOUNTER — ONCOLOGY VISIT (OUTPATIENT)
Dept: SURGERY | Facility: CLINIC | Age: 53
End: 2023-12-14
Attending: PHYSICIAN ASSISTANT
Payer: COMMERCIAL

## 2023-12-14 VITALS
SYSTOLIC BLOOD PRESSURE: 121 MMHG | RESPIRATION RATE: 16 BRPM | TEMPERATURE: 97.7 F | OXYGEN SATURATION: 99 % | BODY MASS INDEX: 23.04 KG/M2 | HEART RATE: 64 BPM | WEIGHT: 151.5 LBS | DIASTOLIC BLOOD PRESSURE: 80 MMHG

## 2023-12-14 DIAGNOSIS — Z12.31 VISIT FOR SCREENING MAMMOGRAM: ICD-10-CM

## 2023-12-14 DIAGNOSIS — D05.12 DUCTAL CARCINOMA IN SITU (DCIS) OF LEFT BREAST: Primary | ICD-10-CM

## 2023-12-14 PROCEDURE — 99213 OFFICE O/P EST LOW 20 MIN: CPT | Performed by: PHYSICIAN ASSISTANT

## 2023-12-14 PROCEDURE — 77067 SCR MAMMO BI INCL CAD: CPT | Performed by: RADIOLOGY

## 2023-12-14 PROCEDURE — 77063 BREAST TOMOSYNTHESIS BI: CPT | Performed by: RADIOLOGY

## 2023-12-14 ASSESSMENT — PAIN SCALES - GENERAL: PAINLEVEL: NO PAIN (0)

## 2023-12-14 NOTE — LETTER
2023         RE: Deirdre Jesus  [id number]  [place] [address]        Dear Colleague,    Thank you for referring your patient, Deirdre Jesus, to the [facility]. Please see a copy of my visit note below.    FOLLOW UP  Dec 14, 2023     Deirdre Jesus is a 53 year old woman who presents with history of left breast ductal carcinoma in situ.      HPI:     She was found on screening mammogram in  to have left breast calcifications. Biopsy demonstrated DCIS grade 2, ER/AL positive. She underwent a lumpectomy with Dr. Chiang. She elected not to have radiation or endocrine therapy.  She had negative genetic testing in .     Today she denies any breast concerns, mass, skin change, nipple inversion or nipple discharge.      BREAST-SPECIFIC HISTORY:     Previous breast imaging: Yes  - 13 Smammo BI-RADS 1  - 16 Smammo BI-RADS 1  - 3/1/18 Smammo BI-RADS 1  - 19 Smammo BI-RADS 1  - 20 Smammo: left breast calcifications BI-RADS 0  - 20 left Dmammo: calcifications BI-RADS 4  - 21 left breast stereotactic biopsy: DCIS, grade 3, ER positive, AL positive  - 21 left wire-localized lumpectomy: DCIS 8 mm, grade 2, negative margins  - 12/15/21 Smammo BI-RADS 2  - 22 Smammo BI-RADS 1     Prior breast biopsies/surgeries: Yes   - 21 left breast stereotactic biopsy: DCIS, grade 3, ER positive, AL positive  - 21 left wire-localized lumpectomy: DCIS, grade 2, negative margins     Prior history of breast cancer: yes  -  left breast DCIS grade 2, ER positive s/p lumpectomy.   Prior radiation history: No   Self breast exams: yes  Breast density: heterogeneously dense     GYN HISTORY:  . Age at 1st pregnancy: 24. Breastfeeding history: Yes.   Age at menarche:   Menopausal: post menopausal age 49.   Menopausal hormone replacement therapy: No      FAMILY HISTORY:  Breast ca: No  Ovarian ca: No  Pancreatic ca: No  Prostate: Yes   -  brother, 65  Gastric ca: No  Melanoma: No  Colon ca: No  Other cancer: No  Other genetic, testing, syndromes, or clotting disorders: no     PAST MEDICAL HISTORY  Past Medical History:   Diagnosis Date    Breast cancer (H) 12/2020    DCIS left breast treated with lumpectomy. ER/DE +     PAST SURGICAL HISTORY   Past Surgical History:   Procedure Laterality Date    BIOPSY  1/5/01    breast biopsy    BUNIONECTOMY Bilateral     COLONOSCOPY  2018    COLONOSCOPY WITH CO2 INSUFFLATION N/A 10/19/2018    Procedure: colonscopy;  Surgeon: Duane, William Charles, MD;  Location: [facility]    LUMPECTOMY BREAST Left 2/1/2021    Procedure: Left wire localized lumpectomy;  Surgeon: Sung Chiang MD;  Location: [facility] OR    ORTHOPEDIC SURGERY  1982    bunion surgery     MEDICATIONS  Current Outpatient Medications   Medication Sig Dispense Refill    Cholecalciferol (VITAMIN D PO)       multivitamin w/minerals (THERA-VIT-M) tablet Take 1 tablet by mouth daily        ALLERGIES   No Known Allergies     SOCIAL HISTORY:  Smokes: No  EtOH: No  Exercise: runs    She has 2 children in their 20's. Her daughter lives local and works in [employer], her son is at home and finishing law school.     ROS:  Change in vision No  Headaches: no  Respiratory: No shortness of breath, dyspnea on exertion, cough, or hemoptysis   Cardiovascular: negative   Gastrointestinal: negative Abdominal pain: no  Breast: negative  Musculoskeletal: negative Joint pain No Back pain: no  Psychiatric: negative  Hematologic/Lymphatic/Immunologic: negative  Endocrine: negative    EXAM  /80 (BP Location: Right arm, Patient Position: Sitting, Cuff Size: Adult Regular)   Pulse 64   Temp 97.7  F (36.5  C) (Oral)   Resp 16   Wt 68.7 kg (151 lb 8 oz)   SpO2 99%   BMI 23.04 kg/m     PHYSICAL EXAM  Respiratory: breathing non labored.   Breasts: Examination was done in both the upright and supine positions.  Breasts are symmetrical . No dominant fixed or suspicious masses noted.  No skin or nipple changes. No nipple discharge.   Left breast scar well healed.   No clavicular, cervical, or axillary lymphadenopathy.     INVESTIGATIONS:   12/14/23 screening mammogram: per Radiology no concerning findings, final report pending.     ASSESSMENT/PLAN:    Deirdre Jesus is a 53 year old woman with history of left breast estrogen receptor positive ductal carcinoma in situ s/p lumpectomy.      1) Surveillance  History and physical examination every 6-12 months for 5 years, then annually. Mammogram every 12 months.  - Screening mammogram with same day clinic visit due: 12/15/24     2) Lifestyle modifications  - Maintain your best healthy weight. Higher body fat and adult weight gain is associated with increased risk for breast cancer. This increase in risk has been attributed to increase in circulating endogenous estrogen levels from fat tissue.   - Alcohol can raise estrogen. Alcohol consumption, even at moderate levels (1-2 drinks per day), increases breast cancer risk and are best avoided. If you choose to drink alcohol limit alcohol consumption to less than 1 drink per day. (1 ounce of liquor, 6 ounces of wine, or 8 ounces of beer).  - Be active daily and void being sedentary.     Jesi Escobar PA-C    20 minutes spent on the date of the encounter doing chart review, review of test results, interpretation of tests, patient visit and documentation.

## 2023-12-14 NOTE — NURSING NOTE
"Oncology Rooming Note    December 14, 2023 10:05 AM   Deirdre Jesus is a 53 year old female who presents for:    Chief Complaint   Patient presents with    Oncology Clinic Visit     Ductal carcinoma in situ of left breast      Initial Vitals: /80 (BP Location: Right arm, Patient Position: Sitting, Cuff Size: Adult Regular)   Pulse 64   Temp 97.7  F (36.5  C) (Oral)   Resp 16   Wt 68.7 kg (151 lb 8 oz)   SpO2 99%   BMI 23.04 kg/m   Estimated body mass index is 23.04 kg/m  as calculated from the following:    Height as of 4/4/23: 1.727 m (5' 8\").    Weight as of this encounter: 68.7 kg (151 lb 8 oz). Body surface area is 1.82 meters squared.  No Pain (0) Comment: Data Unavailable   No LMP recorded. Patient is postmenopausal.  Allergies reviewed: Yes  Medications reviewed: Yes    Medications: Medication refills not needed today.  Pharmacy name entered into "[employer]: [id number] - [place], [place] - [address]    Clinical concerns:  none      Bree Davis              "

## 2023-12-14 NOTE — PATIENT INSTRUCTIONS
Deirdre Jesus is a 53 year old woman with history of left breast estrogen receptor positive ductal carcinoma in situ s/p lumpectomy.      1) Surveillance  History and physical examination every 6-12 months for 5 years, then annually. Mammogram every 12 months.  - Screening mammogram with same day clinic visit due: 12/15/24     2) Lifestyle modifications  - Maintain your best healthy weight. Higher body fat and adult weight gain is associated with increased risk for breast cancer. This increase in risk has been attributed to increase in circulating endogenous estrogen levels from fat tissue.   - Alcohol can raise estrogen. Alcohol consumption, even at moderate levels (1-2 drinks per day), increases breast cancer risk and are best avoided. If you choose to drink alcohol limit alcohol consumption to less than 1 drink per day. (1 ounce of liquor, 6 ounces of wine, or 8 ounces of beer).  - Be active daily and void being sedentary.

## 2024-03-05 ENCOUNTER — PATIENT OUTREACH (OUTPATIENT)
Dept: CARE COORDINATION | Facility: CLINIC | Age: 54
End: 2024-03-05
Payer: COMMERCIAL

## 2024-03-19 ENCOUNTER — PATIENT OUTREACH (OUTPATIENT)
Dept: CARE COORDINATION | Facility: CLINIC | Age: 54
End: 2024-03-19
Payer: COMMERCIAL

## 2024-05-26 ENCOUNTER — HEALTH MAINTENANCE LETTER (OUTPATIENT)
Age: 54
End: 2024-05-26

## 2024-07-27 ENCOUNTER — TELEPHONE (OUTPATIENT)
Dept: FAMILY MEDICINE | Facility: CLINIC | Age: 54
End: 2024-07-27
Payer: COMMERCIAL

## 2024-07-27 DIAGNOSIS — S40.861A TICK BITE OF RIGHT UPPER ARM, INITIAL ENCOUNTER: Primary | ICD-10-CM
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
## 2024-07-27 RX ORDER — DOXYCYCLINE 100 MG/1
100 CAPSULE ORAL 2 TIMES DAILY
Qty: 20 CAPSULE | Refills: 0 | Status: SHIPPED | OUTPATIENT
Start: 2024-07-27 | End: 2024-08-06

## 2024-12-16 NOTE — PROGRESS NOTES
FOLLOW UP  Dec 18, 2024     Deirdre Jesus is a 54 year old woman who presents with history of left breast ductal carcinoma in situ.      HPI:     She was found on screening mammogram in  to have left breast calcifications. Biopsy demonstrated DCIS grade 2, ER/RI positive. She underwent a lumpectomy with Dr. Chiang. She elected not to have radiation or endocrine therapy. She had negative genetic testing in .     Her most recent mammogram was 23.    Today she denies any breast concerns, mass, skin change, nipple inversion or nipple discharge.      BREAST-SPECIFIC HISTORY:     Previous breast imaging: Yes  - 13 Smammo BI-RADS 1  - 16 Smammo BI-RADS 1  - 3/1/18 Smammo BI-RADS 1  - 19 Smammo BI-RADS 1  - 20 Smammo: left breast calcifications BI-RADS 0  - 20 left Dmammo: calcifications BI-RADS 4  - 21 left breast stereotactic biopsy: DCIS, grade 3, ER positive, RI positive  - 21 left wire-localized lumpectomy: DCIS 8 mm, grade 2, negative margins  - 12/15/21 Smammo BI-RADS 2  - 22 Smammo BI-RADS 1  - 23 Smammo BI-RADS 2     Prior breast biopsies/surgeries: Yes   - 21 left breast stereotactic biopsy: DCIS, grade 3, ER positive, RI positive  - 21 left wire-localized lumpectomy: DCIS, grade 2, negative margins     Prior history of breast cancer: yes  -  left breast DCIS grade 2, ER positive s/p lumpectomy.   Prior radiation history: No   Self breast exams: yes  Breast density: heterogeneously dense     GYN HISTORY:  . Age at 1st pregnancy: 24. Breastfeeding history: Yes.   Age at menarche:   Menopausal: post menopausal age 49.   Menopausal hormone replacement therapy: No     RISK ASSESSMENT   11.8% lifetime risk      FAMILY HISTORY:  Breast ca: No  Ovarian ca: No  Pancreatic ca: No  Prostate: Yes   - brother, 65  Gastric ca: No  Melanoma: No  Colon ca: No  Other cancer: No  Other genetic, testing, syndromes, or clotting disorders: no     PAST  MEDICAL HISTORY  Past Medical History:   Diagnosis Date    Breast cancer (H) 12/2020    DCIS left breast treated with lumpectomy. [place] +     PAST SURGICAL HISTORY   Past Surgical History:   Procedure Laterality Date    BIOPSY  1/5/01    breast biopsy    BUNIONECTOMY Bilateral     COLONOSCOPY  2018    COLONOSCOPY WITH CO2 INSUFFLATION N/A 10/19/2018    Procedure: colonscopy;  Surgeon: Duane, William Charles, MD;  Location: [facility] OR    LUMPECTOMY BREAST Left 2/1/2021    Procedure: Left wire localized lumpectomy;  Surgeon: Sung Chiang MD;  Location:  OR    ORTHOPEDIC SURGERY  1982    bunion surgery     MEDICATIONS  Current Outpatient Medications   Medication Sig Dispense Refill    Cholecalciferol (VITAMIN D PO)       multivitamin w/minerals (THERA-VIT-M) tablet Take 1 tablet by mouth daily        ALLERGIES   No Known Allergies     SOCIAL HISTORY:  Smokes: No  EtOH: No  Exercise: runs    She has 2 children in their 20's. Her daughter lives local and works in [employer], her son is at home and finishing law school.     ROS:  Change in vision No  Headaches: no  Respiratory: No shortness of breath, dyspnea on exertion, cough, or hemoptysis   Cardiovascular: negative   Gastrointestinal: negative Abdominal pain: no  Breast: negative  Musculoskeletal: negative Joint pain No Back pain: no  Psychiatric: negative  Hematologic/Lymphatic/Immunologic: negative  Endocrine: negative    EXAM  /82 (BP Location: Right arm, Patient Position: Sitting, Cuff Size: Adult Regular)   Pulse 67   Temp 97.6  F (36.4  C) (Oral)   Resp 16   Wt 72.4 kg (159 lb 11.2 oz)   SpO2 100%   BMI 24.28 kg/m     PHYSICAL EXAM  Respiratory: breathing non labored.   Breasts: Examination was done in both the upright and supine positions.  Breasts are symmetrical . No dominant fixed or suspicious masses noted. No skin or nipple changes. No nipple discharge.   Left breast scar well healed.   No clavicular, cervical, or axillary lymphadenopathy.      INVESTIGATIONS:   12/18/24 screening mammogram, results pending.     ASSESSMENT/PLAN:    Deirdre Jesus is a 54 year old woman with history of left breast estrogen receptor positive ductal carcinoma in situ s/p lumpectomy.      1) Surveillance  History and physical examination every 6-12 months for 5 years, then annually. Mammogram every 12 months.  - Screening mammogram with same day clinic visit due: 12/19/25     2) Lifestyle modifications  - Maintain your best healthy weight. Higher body fat and adult weight gain is associated with increased risk for breast cancer. This increase in risk has been attributed to increase in circulating endogenous estrogen levels from fat tissue.   - Alcohol can raise estrogen. Alcohol consumption, even at moderate levels (1-2 drinks per day), increases breast cancer risk and are best avoided. If you choose to drink alcohol limit alcohol consumption to less than 1 drink per day. (1 ounce of liquor, 6 ounces of wine, or 8 ounces of beer).  - Be active daily and void being sedentary.     Jesi Escobar PA-C    20 minutes spent on the date of the encounter doing chart review, review of test results, interpretation of tests, patient visit and documentation.

## 2024-12-18 ENCOUNTER — ANCILLARY PROCEDURE (OUTPATIENT)
Dept: MAMMOGRAPHY | Facility: CLINIC | Age: 54
End: 2024-12-18
Attending: PHYSICIAN ASSISTANT
Payer: COMMERCIAL

## 2024-12-18 ENCOUNTER — ONCOLOGY VISIT (OUTPATIENT)
Dept: SURGERY | Facility: CLINIC | Age: 54
End: 2024-12-18
Attending: PHYSICIAN ASSISTANT
Payer: COMMERCIAL

## 2024-12-18 VITALS
HEART RATE: 67 BPM | RESPIRATION RATE: 16 BRPM | OXYGEN SATURATION: 100 % | SYSTOLIC BLOOD PRESSURE: 124 MMHG | BODY MASS INDEX: 24.28 KG/M2 | WEIGHT: 159.7 LBS | DIASTOLIC BLOOD PRESSURE: 82 MMHG | TEMPERATURE: 97.6 F

## 2024-12-18 DIAGNOSIS — Z12.31 ENCOUNTER FOR SCREENING MAMMOGRAM FOR BREAST CANCER: Primary | ICD-10-CM

## 2024-12-18 DIAGNOSIS — Z12.31 VISIT FOR SCREENING MAMMOGRAM: ICD-10-CM

## 2024-12-18 PROCEDURE — 77067 SCR MAMMO BI INCL CAD: CPT | Performed by: RADIOLOGY

## 2024-12-18 PROCEDURE — 77063 BREAST TOMOSYNTHESIS BI: CPT | Performed by: RADIOLOGY

## 2024-12-18 PROCEDURE — 99213 OFFICE O/P EST LOW 20 MIN: CPT | Performed by: PHYSICIAN ASSISTANT

## 2024-12-18 ASSESSMENT — PAIN SCALES - GENERAL: PAINLEVEL_OUTOF10: NO PAIN (0)

## 2024-12-18 NOTE — NURSING NOTE
"Oncology Rooming Note    December 18, 2024 1:00 PM   Deirdre Jesus is a 54 year old female who presents for:    Chief Complaint   Patient presents with    Oncology Clinic Visit     Breast Cancer     Initial Vitals: There were no vitals taken for this visit. Estimated body mass index is 23.04 kg/m  as calculated from the following:    Height as of 4/4/23: 1.727 m (5' 8\").    Weight as of 12/14/23: 68.7 kg (151 lb 8 oz). There is no height or weight on file to calculate BSA.  No Pain (0) Comment: Data Unavailable   No LMP recorded. Patient is postmenopausal.  Allergies reviewed: Yes  Medications reviewed: Yes    Medications: Medication refills not needed today.  Pharmacy name entered into [facility]: [id number] [place], [place] - [address] [address]    Frailty Screening:   Is the patient here for a new oncology consult visit in cancer care? 2. No      Clinical concerns: none     Lissa Lugo"

## 2024-12-18 NOTE — PATIENT INSTRUCTIONS
Deirdre Jesus is a 54 year old woman with history of left breast estrogen receptor positive ductal carcinoma in situ s/p lumpectomy.      1) Surveillance  History and physical examination every 6-12 months for 5 years, then annually. Mammogram every 12 months.  - Screening mammogram with same day clinic visit due: 12/19/25     2) Lifestyle modifications  - Maintain your best healthy weight. Higher body fat and adult weight gain is associated with increased risk for breast cancer. This increase in risk has been attributed to increase in circulating endogenous estrogen levels from fat tissue.   - Alcohol can raise estrogen. Alcohol consumption, even at moderate levels (1-2 drinks per day), increases breast cancer risk and are best avoided. If you choose to drink alcohol limit alcohol consumption to less than 1 drink per day. (1 ounce of liquor, 6 ounces of wine, or 8 ounces of beer).  - Be active daily and void being sedentary.

## 2024-12-18 NOTE — LETTER
2024      Deirdre Jesus  [address]  [place] [address]      Dear Colleague,    Thank you for referring your patient, Deirdre Jesus, to the [facility]. Please see a copy of my visit note below.    FOLLOW UP  Dec 18, 2024     Deirdre Jesus is a 54 year old woman who presents with history of left breast ductal carcinoma in situ.      HPI:     She was found on screening mammogram in  to have left breast calcifications. Biopsy demonstrated DCIS grade 2, ER/OH positive. She underwent a lumpectomy with Dr. Chiang. She elected not to have radiation or endocrine therapy. She had negative genetic testing in .     Her most recent mammogram was 23.    Today she denies any breast concerns, mass, skin change, nipple inversion or nipple discharge.      BREAST-SPECIFIC HISTORY:     Previous breast imaging: Yes  - 13 Smammo BI-RADS 1  - 16 Smammo BI-RADS 1  - 3/1/18 Smammo BI-RADS 1  - 19 Smammo BI-RADS 1  - 20 Smammo: left breast calcifications BI-RADS 0  - 20 left Dmammo: calcifications BI-RADS 4  - 21 left breast stereotactic biopsy: DCIS, grade 3, ER positive, OH positive  - 21 left wire-localized lumpectomy: DCIS 8 mm, grade 2, negative margins  - 12/15/21 Smammo BI-RADS 2  - 22 Smammo BI-RADS 1  - 23 Smammo BI-RADS 2     Prior breast biopsies/surgeries: Yes   - 21 left breast stereotactic biopsy: DCIS, grade 3, ER positive, OH positive  - 21 left wire-localized lumpectomy: DCIS, grade 2, negative margins     Prior history of breast cancer: yes  -  left breast DCIS grade 2, ER positive s/p lumpectomy.   Prior radiation history: No   Self breast exams: yes  Breast density: heterogeneously dense     GYN HISTORY:  . Age at 1st pregnancy: 24. Breastfeeding history: Yes.   Age at menarche:   Menopausal: post menopausal age 49.   Menopausal hormone replacement therapy: No     RISK ASSESSMENT   11.8% lifetime  risk      FAMILY HISTORY:  Breast ca: No  Ovarian ca: No  Pancreatic ca: No  Prostate: Yes   - brother, 65  Gastric ca: No  Melanoma: No  Colon ca: No  Other cancer: No  Other genetic, testing, syndromes, or clotting disorders: no     PAST MEDICAL HISTORY  Past Medical History:   Diagnosis Date     Breast cancer (H) 12/2020    DCIS left breast treated with lumpectomy. ER/[place] +     PAST SURGICAL HISTORY   Past Surgical History:   Procedure Laterality Date     BIOPSY  1/5/01    breast biopsy     BUNIONECTOMY Bilateral      COLONOSCOPY  2018     COLONOSCOPY WITH CO2 INSUFFLATION N/A 10/19/2018    Procedure: colonscopy;  Surgeon: Duane, William Charles, MD;  Location: [facility] OR     LUMPECTOMY BREAST Left 2/1/2021    Procedure: Left wire localized lumpectomy;  Surgeon: Sung Chiang MD;  Location:  OR     ORTHOPEDIC SURGERY  1982    bunion surgery     MEDICATIONS  Current Outpatient Medications   Medication Sig Dispense Refill     Cholecalciferol (VITAMIN D PO)        multivitamin w/minerals (THERA-VIT-M) tablet Take 1 tablet by mouth daily        ALLERGIES   No Known Allergies     SOCIAL HISTORY:  Smokes: No  EtOH: No  Exercise: runs    She has 2 children in their 20's. Her daughter lives local and works in [employer], her son is at home and finishing law school.     ROS:  Change in vision No  Headaches: no  Respiratory: No shortness of breath, dyspnea on exertion, cough, or hemoptysis   Cardiovascular: negative   Gastrointestinal: negative Abdominal pain: no  Breast: negative  Musculoskeletal: negative Joint pain No Back pain: no  Psychiatric: negative  Hematologic/Lymphatic/Immunologic: negative  Endocrine: negative    EXAM  /82 (BP Location: Right arm, Patient Position: Sitting, Cuff Size: Adult Regular)   Pulse 67   Temp 97.6  F (36.4  C) (Oral)   Resp 16   Wt 72.4 kg (159 lb 11.2 oz)   SpO2 100%   BMI 24.28 kg/m     PHYSICAL EXAM  Respiratory: breathing non labored.   Breasts: Examination was done in  both the upright and supine positions.  Breasts are symmetrical . No dominant fixed or suspicious masses noted. No skin or nipple changes. No nipple discharge.   Left breast scar well healed.   No clavicular, cervical, or axillary lymphadenopathy.     INVESTIGATIONS:   12/18/24 screening mammogram, results pending.     ASSESSMENT/PLAN:    Deirdre Jesus is a 54 year old woman with history of left breast estrogen receptor positive ductal carcinoma in situ s/p lumpectomy.      1) Surveillance  History and physical examination every 6-12 months for 5 years, then annually. Mammogram every 12 months.  - Screening mammogram with same day clinic visit due: 12/19/25     2) Lifestyle modifications  - Maintain your best healthy weight. Higher body fat and adult weight gain is associated with increased risk for breast cancer. This increase in risk has been attributed to increase in circulating endogenous estrogen levels from fat tissue.   - Alcohol can raise estrogen. Alcohol consumption, even at moderate levels (1-2 drinks per day), increases breast cancer risk and are best avoided. If you choose to drink alcohol limit alcohol consumption to less than 1 drink per day. (1 ounce of liquor, 6 ounces of wine, or 8 ounces of beer).  - Be active daily and void being sedentary.     Jesi Escobar PA-C    20 minutes spent on the date of the encounter doing chart review, review of test results, interpretation of tests, patient visit and documentation.       Again, thank you for allowing me to participate in the care of your patient.        Sincerely,        Jesi Escobar PA-C

## 2025-06-14 ENCOUNTER — HEALTH MAINTENANCE LETTER (OUTPATIENT)
Age: 55
End: 2025-06-14

## (undated) DEVICE — Device

## (undated) DEVICE — SYR 10ML FINGER CONTROL W/O NDL 309695

## (undated) DEVICE — ADH SKIN CLOSURE PREMIERPRO EXOFIN 1.0ML 3470

## (undated) DEVICE — ESU ELEC BLADE 2.75" COATED/INSULATED E1455

## (undated) DEVICE — ESU GROUND PAD ADULT W/CORD E7507

## (undated) DEVICE — LINEN TOWEL PACK X6 WHITE 5487

## (undated) DEVICE — CLIP HORIZON SM RED WIDE SLOT 001201

## (undated) DEVICE — MARKER MARGIN MARKER STD 6 COLOR SGL USE MMS6

## (undated) DEVICE — SOL WATER IRRIG 1000ML BOTTLE 07139-09

## (undated) DEVICE — PREP CHLORAPREP 26ML TINTED ORANGE  260815

## (undated) DEVICE — SU VICRYL 3-0 SH 27" J316H

## (undated) DEVICE — SU SILK 3-0 SH 30" K832H

## (undated) DEVICE — SUCTION MANIFOLD NEPTUNE 2 SYS 4 PORT 0702-020-000

## (undated) DEVICE — SOL WATER IRRIG 1000ML BOTTLE 2F7114

## (undated) DEVICE — CLIP HORIZON MED BLUE 002200

## (undated) DEVICE — GLOVE PROTEXIS POWDER FREE SMT 8.0  2D72PT80X

## (undated) DEVICE — LINEN TOWEL PACK X5 5464

## (undated) DEVICE — SU PDS II 4-0 FS-2 27" Z422H

## (undated) RX ORDER — FENTANYL CITRATE 50 UG/ML
INJECTION, SOLUTION INTRAMUSCULAR; INTRAVENOUS
Status: DISPENSED
Start: 2018-10-19

## (undated) RX ORDER — ONDANSETRON 2 MG/ML
INJECTION INTRAMUSCULAR; INTRAVENOUS
Status: DISPENSED
Start: 2021-02-01

## (undated) RX ORDER — ACETAMINOPHEN 325 MG/1
TABLET ORAL
Status: DISPENSED
Start: 2021-02-01

## (undated) RX ORDER — BUPIVACAINE HYDROCHLORIDE 5 MG/ML
INJECTION, SOLUTION EPIDURAL; INTRACAUDAL
Status: DISPENSED
Start: 2021-02-01

## (undated) RX ORDER — SIMETHICONE 40MG/0.6ML
SUSPENSION, DROPS(FINAL DOSAGE FORM)(ML) ORAL
Status: DISPENSED
Start: 2018-10-19

## (undated) RX ORDER — FENTANYL CITRATE 50 UG/ML
INJECTION, SOLUTION INTRAMUSCULAR; INTRAVENOUS
Status: DISPENSED
Start: 2021-02-01

## (undated) RX ORDER — LIDOCAINE HYDROCHLORIDE 20 MG/ML
INJECTION, SOLUTION EPIDURAL; INFILTRATION; INTRACAUDAL; PERINEURAL
Status: DISPENSED
Start: 2021-02-01

## (undated) RX ORDER — GLYCOPYRROLATE 0.2 MG/ML
INJECTION, SOLUTION INTRAMUSCULAR; INTRAVENOUS
Status: DISPENSED
Start: 2021-02-01

## (undated) RX ORDER — DEXAMETHASONE SODIUM PHOSPHATE 4 MG/ML
INJECTION, SOLUTION INTRA-ARTICULAR; INTRALESIONAL; INTRAMUSCULAR; INTRAVENOUS; SOFT TISSUE
Status: DISPENSED
Start: 2021-02-01

## (undated) RX ORDER — PROPOFOL 10 MG/ML
INJECTION, EMULSION INTRAVENOUS
Status: DISPENSED
Start: 2021-02-01

## (undated) RX ORDER — CEFAZOLIN SODIUM 2 G/100ML
INJECTION, SOLUTION INTRAVENOUS
Status: DISPENSED
Start: 2021-02-01

## (undated) RX ORDER — LIDOCAINE HYDROCHLORIDE AND EPINEPHRINE 10; 10 MG/ML; UG/ML
INJECTION, SOLUTION INFILTRATION; PERINEURAL
Status: DISPENSED
Start: 2021-02-01